# Patient Record
Sex: MALE | Race: WHITE | Employment: FULL TIME | URBAN - METROPOLITAN AREA
[De-identification: names, ages, dates, MRNs, and addresses within clinical notes are randomized per-mention and may not be internally consistent; named-entity substitution may affect disease eponyms.]

---

## 2017-07-11 ENCOUNTER — OFFICE VISIT (OUTPATIENT)
Dept: ORTHOPEDIC SURGERY | Age: 22
End: 2017-07-11

## 2017-07-11 VITALS
SYSTOLIC BLOOD PRESSURE: 143 MMHG | HEART RATE: 99 BPM | HEIGHT: 72 IN | DIASTOLIC BLOOD PRESSURE: 80 MMHG | WEIGHT: 205.03 LBS | BODY MASS INDEX: 27.77 KG/M2

## 2017-07-11 DIAGNOSIS — S82.852A TRIMALLEOLAR FRACTURE OF ANKLE, CLOSED, LEFT, INITIAL ENCOUNTER: Primary | ICD-10-CM

## 2017-07-11 PROBLEM — S82.853A TRIMALLEOLAR FRACTURE OF ANKLE, CLOSED: Status: ACTIVE | Noted: 2017-07-11

## 2017-07-11 PROCEDURE — 99243 OFF/OP CNSLTJ NEW/EST LOW 30: CPT | Performed by: ORTHOPAEDIC SURGERY

## 2017-07-11 RX ORDER — CEPHALEXIN 500 MG/1
500 CAPSULE ORAL 4 TIMES DAILY
Qty: 8 CAPSULE | Refills: 0 | Status: SHIPPED | OUTPATIENT
Start: 2017-07-11 | End: 2017-08-08

## 2017-07-11 RX ORDER — OXYCODONE HYDROCHLORIDE AND ACETAMINOPHEN 5; 325 MG/1; MG/1
1-2 TABLET ORAL EVERY 6 HOURS PRN
Qty: 60 TABLET | Refills: 0 | Status: SHIPPED | OUTPATIENT
Start: 2017-07-11 | End: 2017-08-10

## 2017-07-12 ENCOUNTER — HOSPITAL ENCOUNTER (OUTPATIENT)
Dept: OTHER | Age: 22
Discharge: HOME | End: 2017-07-12
Attending: ORTHOPAEDIC SURGERY | Admitting: ORTHOPAEDIC SURGERY

## 2017-07-12 LAB
ANION GAP SERPL CALCULATED.3IONS-SCNC: 13 MMOL/L (ref 3–16)
BUN BLDV-MCNC: 15 MG/DL (ref 7–20)
CALCIUM SERPL-MCNC: 9.7 MG/DL (ref 8.3–10.6)
CHLORIDE BLD-SCNC: 100 MMOL/L (ref 99–110)
CO2: 26 MMOL/L (ref 21–32)
CREAT SERPL-MCNC: 0.8 MG/DL (ref 0.9–1.3)
GFR AFRICAN AMERICAN: >60
GFR NON-AFRICAN AMERICAN: >60
GLUCOSE BLD-MCNC: 94 MG/DL (ref 70–99)
POTASSIUM SERPL-SCNC: 4.2 MMOL/L (ref 3.5–5.1)
SODIUM BLD-SCNC: 139 MMOL/L (ref 136–145)

## 2017-07-13 ENCOUNTER — HOSPITAL ENCOUNTER (OUTPATIENT)
Dept: SURGERY | Age: 22
Discharge: HOME | End: 2017-07-13
Attending: ORTHOPAEDIC SURGERY | Admitting: ORTHOPAEDIC SURGERY

## 2017-07-13 VITALS
WEIGHT: 205 LBS | TEMPERATURE: 97 F | OXYGEN SATURATION: 96 % | HEIGHT: 73 IN | SYSTOLIC BLOOD PRESSURE: 138 MMHG | HEART RATE: 78 BPM | BODY MASS INDEX: 27.17 KG/M2 | DIASTOLIC BLOOD PRESSURE: 68 MMHG | RESPIRATION RATE: 16 BRPM

## 2017-07-13 DIAGNOSIS — S82.852A CLOSED DISPLACED TRIMALLEOLAR FRACTURE OF LEFT LOWER LEG: ICD-10-CM

## 2017-07-13 RX ORDER — PROMETHAZINE HYDROCHLORIDE 25 MG/ML
6.25 INJECTION, SOLUTION INTRAMUSCULAR; INTRAVENOUS
Status: COMPLETED | OUTPATIENT
Start: 2017-07-13 | End: 2017-07-13

## 2017-07-13 RX ORDER — MORPHINE SULFATE 2 MG/ML
2 INJECTION, SOLUTION INTRAMUSCULAR; INTRAVENOUS EVERY 5 MIN PRN
Status: DISCONTINUED | OUTPATIENT
Start: 2017-07-13 | End: 2017-07-14 | Stop reason: HOSPADM

## 2017-07-13 RX ORDER — SODIUM CHLORIDE 0.9 % (FLUSH) 0.9 %
10 SYRINGE (ML) INJECTION EVERY 12 HOURS SCHEDULED
Status: DISCONTINUED | OUTPATIENT
Start: 2017-07-13 | End: 2017-07-14 | Stop reason: HOSPADM

## 2017-07-13 RX ORDER — MORPHINE SULFATE 2 MG/ML
1 INJECTION, SOLUTION INTRAMUSCULAR; INTRAVENOUS EVERY 5 MIN PRN
Status: DISCONTINUED | OUTPATIENT
Start: 2017-07-13 | End: 2017-07-14 | Stop reason: HOSPADM

## 2017-07-13 RX ORDER — MEPERIDINE HYDROCHLORIDE 50 MG/ML
12.5 INJECTION INTRAMUSCULAR; INTRAVENOUS; SUBCUTANEOUS EVERY 5 MIN PRN
Status: DISCONTINUED | OUTPATIENT
Start: 2017-07-13 | End: 2017-07-14 | Stop reason: HOSPADM

## 2017-07-13 RX ORDER — SODIUM CHLORIDE 0.9 % (FLUSH) 0.9 %
10 SYRINGE (ML) INJECTION PRN
Status: DISCONTINUED | OUTPATIENT
Start: 2017-07-13 | End: 2017-07-14 | Stop reason: HOSPADM

## 2017-07-13 RX ORDER — MIDAZOLAM HYDROCHLORIDE 5 MG/ML
INJECTION INTRAMUSCULAR; INTRAVENOUS
Status: DISCONTINUED
Start: 2017-07-13 | End: 2017-07-14 | Stop reason: HOSPADM

## 2017-07-13 RX ORDER — HYDRALAZINE HYDROCHLORIDE 20 MG/ML
5 INJECTION INTRAMUSCULAR; INTRAVENOUS EVERY 10 MIN PRN
Status: DISCONTINUED | OUTPATIENT
Start: 2017-07-13 | End: 2017-07-14 | Stop reason: HOSPADM

## 2017-07-13 RX ORDER — DIPHENHYDRAMINE HYDROCHLORIDE 50 MG/ML
12.5 INJECTION INTRAMUSCULAR; INTRAVENOUS
Status: ACTIVE | OUTPATIENT
Start: 2017-07-13 | End: 2017-07-13

## 2017-07-13 RX ORDER — OXYCODONE HYDROCHLORIDE AND ACETAMINOPHEN 5; 325 MG/1; MG/1
2 TABLET ORAL PRN
Status: ACTIVE | OUTPATIENT
Start: 2017-07-13 | End: 2017-07-13

## 2017-07-13 RX ORDER — OXYCODONE HYDROCHLORIDE AND ACETAMINOPHEN 5; 325 MG/1; MG/1
1 TABLET ORAL PRN
Status: ACTIVE | OUTPATIENT
Start: 2017-07-13 | End: 2017-07-13

## 2017-07-13 RX ORDER — ONDANSETRON 2 MG/ML
4 INJECTION INTRAMUSCULAR; INTRAVENOUS
Status: ACTIVE | OUTPATIENT
Start: 2017-07-13 | End: 2017-07-13

## 2017-07-13 RX ORDER — LABETALOL HYDROCHLORIDE 5 MG/ML
5 INJECTION, SOLUTION INTRAVENOUS EVERY 10 MIN PRN
Status: DISCONTINUED | OUTPATIENT
Start: 2017-07-13 | End: 2017-07-14 | Stop reason: HOSPADM

## 2017-07-13 RX ORDER — SODIUM CHLORIDE, SODIUM LACTATE, POTASSIUM CHLORIDE, CALCIUM CHLORIDE 600; 310; 30; 20 MG/100ML; MG/100ML; MG/100ML; MG/100ML
INJECTION, SOLUTION INTRAVENOUS CONTINUOUS
Status: DISCONTINUED | OUTPATIENT
Start: 2017-07-13 | End: 2017-07-14 | Stop reason: HOSPADM

## 2017-07-13 RX ORDER — LIDOCAINE HYDROCHLORIDE 10 MG/ML
0.3 INJECTION, SOLUTION EPIDURAL; INFILTRATION; INTRACAUDAL; PERINEURAL
Status: ACTIVE | OUTPATIENT
Start: 2017-07-13 | End: 2017-07-13

## 2017-07-13 RX ADMIN — Medication 0.5 MG: at 17:30

## 2017-07-13 RX ADMIN — Medication 0.5 MG: at 17:50

## 2017-07-13 RX ADMIN — SODIUM CHLORIDE, SODIUM LACTATE, POTASSIUM CHLORIDE, CALCIUM CHLORIDE: 600; 310; 30; 20 INJECTION, SOLUTION INTRAVENOUS at 17:42

## 2017-07-13 RX ADMIN — SODIUM CHLORIDE, SODIUM LACTATE, POTASSIUM CHLORIDE, CALCIUM CHLORIDE: 600; 310; 30; 20 INJECTION, SOLUTION INTRAVENOUS at 13:57

## 2017-07-13 RX ADMIN — Medication 0.5 MG: at 18:13

## 2017-07-13 RX ADMIN — PROMETHAZINE HYDROCHLORIDE 6.25 MG: 25 INJECTION, SOLUTION INTRAMUSCULAR; INTRAVENOUS at 18:05

## 2017-07-13 RX ADMIN — Medication 0.5 MG: at 18:08

## 2017-07-13 ASSESSMENT — PAIN SCALES - GENERAL
PAINLEVEL_OUTOF10: 0
PAINLEVEL_OUTOF10: 7
PAINLEVEL_OUTOF10: 8
PAINLEVEL_OUTOF10: 0
PAINLEVEL_OUTOF10: 5
PAINLEVEL_OUTOF10: 7
PAINLEVEL_OUTOF10: 7

## 2017-07-13 ASSESSMENT — PAIN - FUNCTIONAL ASSESSMENT: PAIN_FUNCTIONAL_ASSESSMENT: 0-10

## 2017-07-25 ENCOUNTER — OFFICE VISIT (OUTPATIENT)
Dept: ORTHOPEDIC SURGERY | Age: 22
End: 2017-07-25

## 2017-07-25 VITALS
BODY MASS INDEX: 27.17 KG/M2 | HEART RATE: 81 BPM | WEIGHT: 205.03 LBS | SYSTOLIC BLOOD PRESSURE: 115 MMHG | HEIGHT: 73 IN | DIASTOLIC BLOOD PRESSURE: 77 MMHG

## 2017-07-25 DIAGNOSIS — S82.852A TRIMALLEOLAR FRACTURE OF ANKLE, CLOSED, LEFT, INITIAL ENCOUNTER: Primary | ICD-10-CM

## 2017-07-25 PROCEDURE — 99024 POSTOP FOLLOW-UP VISIT: CPT | Performed by: ORTHOPAEDIC SURGERY

## 2017-07-25 PROCEDURE — 73610 X-RAY EXAM OF ANKLE: CPT | Performed by: ORTHOPAEDIC SURGERY

## 2017-08-08 ENCOUNTER — OFFICE VISIT (OUTPATIENT)
Dept: ORTHOPEDIC SURGERY | Age: 22
End: 2017-08-08

## 2017-08-08 VITALS — BODY MASS INDEX: 27.17 KG/M2 | HEIGHT: 73 IN | WEIGHT: 205.03 LBS

## 2017-08-08 DIAGNOSIS — S82.852A TRIMALLEOLAR FRACTURE OF ANKLE, CLOSED, LEFT, INITIAL ENCOUNTER: Primary | ICD-10-CM

## 2017-08-08 PROCEDURE — 73610 X-RAY EXAM OF ANKLE: CPT | Performed by: ORTHOPAEDIC SURGERY

## 2017-08-08 PROCEDURE — L4361 PNEUMA/VAC WALK BOOT PRE OTS: HCPCS | Performed by: ORTHOPAEDIC SURGERY

## 2017-08-08 PROCEDURE — 99024 POSTOP FOLLOW-UP VISIT: CPT | Performed by: ORTHOPAEDIC SURGERY

## 2017-08-29 ENCOUNTER — OFFICE VISIT (OUTPATIENT)
Dept: ORTHOPEDIC SURGERY | Age: 22
End: 2017-08-29

## 2017-08-29 VITALS
SYSTOLIC BLOOD PRESSURE: 114 MMHG | HEART RATE: 77 BPM | DIASTOLIC BLOOD PRESSURE: 79 MMHG | WEIGHT: 205.03 LBS | BODY MASS INDEX: 27.17 KG/M2 | HEIGHT: 73 IN

## 2017-08-29 DIAGNOSIS — S82.852A TRIMALLEOLAR FRACTURE OF ANKLE, CLOSED, LEFT, INITIAL ENCOUNTER: Primary | ICD-10-CM

## 2017-08-29 PROCEDURE — 99024 POSTOP FOLLOW-UP VISIT: CPT | Performed by: ORTHOPAEDIC SURGERY

## 2017-08-29 PROCEDURE — 73610 X-RAY EXAM OF ANKLE: CPT | Performed by: ORTHOPAEDIC SURGERY

## 2017-09-07 ENCOUNTER — HOSPITAL ENCOUNTER (OUTPATIENT)
Dept: PHYSICAL THERAPY | Age: 22
Discharge: HOME | End: 2017-09-30
Admitting: ORTHOPAEDIC SURGERY

## 2017-09-07 DIAGNOSIS — J32.9 CHRONIC SINUSITIS: ICD-10-CM

## 2017-09-07 NOTE — FLOWSHEET NOTE
[facility]  Orthopaedics and Sports Rehabilitation, [facility]    Physical Therapy Daily Treatment Note  Date:  2017    Patient Name:  Esther Baer    :  1995  MRN: [id number]  Restrictions/Precautions:    Medical/Treatment Diagnosis Information:  Diagnosis: S82.852A (ICD-10-CM) - Trimalleolar fracture of ankle, closed, left, initial encounter  Treatment Diagnosis: L foot pain  Insurance/Certification information:  PT Insurance Information:  C $0 CP 80/20 20 PT per year, no auth req  Physician Information:  Referring Practitioner: Dr. Silvia Ho of care signed (Y/N): Y    Date of Patient follow up with Physician:  17    G-Code (if applicable):      Date G-Code Applied:  17  PT G-Codes  Functional Assessment Tool Used: LEFS  Score: 61%  Functional Limitation: Mobility: Walking and moving around  Mobility: Walking and Moving Around Current Status (): At least 60 percent but less than 80 percent impaired, limited or restricted  Mobility: Walking and Moving Around Goal Status ():  At least 20 percent but less than 40 percent impaired, limited or restricted    Progress Note: [x]  Yes  []  No  Next due by: Visit #10       Latex Allergy:  [x]NO      []YES  Preferred Language for Healthcare:   [x]English       []other:    Visit # Insurance Allowable   1 20     Pain level:  5/10     SUBJECTIVE:  See eval    OBJECTIVE: See eval  Observation:   Test measurements:      RESTRICTIONS/PRECAUTIONS: boot donned, WBAT    Exercises/Interventions:     Therapeutic Ex Sets/sec Reps Notes HEP   Seated gastroc stretch 30\" 2  x   4-way ankle strength 20  Green: PF, Red inv/ev x   Seated heel raises 3 10  x   Ankle ABC's 2   x                                                                                       Manual Intervention                                                 NMR re-education                                                                          Therapeutic Exercise and NMR EXR  [x] (04244) Provided verbal/tactile cueing for activities related to strengthening, flexibility, endurance, ROM for improvements in LE, proximal hip, and core control with self care, mobility, lifting, ambulation.  [] (06453) Provided verbal/tactile cueing for activities related to improving balance, coordination, kinesthetic sense, posture, motor skill, proprioception  to assist with LE, proximal hip, and core control in self care, mobility, lifting, ambulation and eccentric single leg control.      NMR and Therapeutic Activities:    [x] (41139 or 91919) Provided verbal/tactile cueing for activities related to improving balance, coordination, kinesthetic sense, posture, motor skill, proprioception and motor activation to allow for proper function of core, proximal hip and LE with self care and ADLs  [] (16458) Gait Re-education- Provided training and instruction to the patient for proper LE, core and proximal hip recruitment and positioning and eccentric body weight control with ambulation re-education including up and down stairs     Home Exercise Program:    [x] (39144) Reviewed/Progressed HEP activities related to strengthening, flexibility, endurance, ROM of core, proximal hip and LE for functional self-care, mobility, lifting and ambulation/stair navigation   [] (36408)Reviewed/Progressed HEP activities related to improving balance, coordination, kinesthetic sense, posture, motor skill, proprioception of core, proximal hip and LE for self care, mobility, lifting, and ambulation/stair navigation      Manual Treatments:  PROM / STM / Oscillations-Mobs:  G-I, II, III, IV (PA's, Inf., Post.)  [] (94145) Provided manual therapy to mobilize LE, proximal hip and/or LS spine soft tissue/joints for the purpose of modulating pain, promoting relaxation,  increasing ROM, reducing/eliminating soft tissue swelling/inflammation/restriction, improving soft tissue extensibility and allowing for proper ROM for normal

## 2017-09-07 NOTE — PLAN OF CARE
devices/WB status) partial weight bearing with bilateral crutches    [x] Patient history, allergies, meds reviewed. Medical chart reviewed. See intake form. Review Of Systems (ROS):  [x]Performed Review of systems (Integumentary, CardioPulmonary, Neurological) by intake and observation. Intake form has been scanned into medical record. Patient has been instructed to contact their primary care physician regarding ROS issues if not already being addressed at this time. Co-morbidities/Complexities (which will affect course of rehabilitation):   [x]None           Arthritic conditions   []Rheumatoid arthritis (M05.9)  []Osteoarthritis (M19.91)   Cardiovascular conditions   []Hypertension (I10)  []Hyperlipidemia (E78.5)  []Angina pectoris (I20)  []Atherosclerosis (I70)   Musculoskeletal conditions   []Disc pathology   []Congenital spine pathologies   []Prior surgical intervention  []Osteoporosis (M81.8)  []Osteopenia (M85.8)   Endocrine conditions   []Hypothyroid (E03.9)  []Hyperthyroid Gastrointestinal conditions   []Constipation (A08.02)   Metabolic conditions   []Morbid obesity (E66.01)  []Diabetes type 1(E10.65) or 2 (E11.65)   []Neuropathy (G60.9)     Pulmonary conditions   []Asthma (J45)  []Coughing   []COPD (J44.9)   Psychological Disorders  []Anxiety (F41.9)  []Depression (F32.9)   []Other:   []Other:          Barriers to/and or personal factors that will affect rehab potential:              []Age  []Sex              []Motivation/Lack of Motivation                        []Co-Morbidities              []Cognitive Function, education/learning barriers              []Environmental, home barriers              []profession/work barriers  []past PT/medical experience  []other:  Justification:     Falls Risk Assessment (30 days):   [x] Falls Risk assessed and no intervention required.   [] Falls Risk assessed and Patient requires intervention due to being higher risk   TUG score (>12s at risk):     [] Falls [x]  NMR activation and proprioception for LE, Glutes and Core   [x]  Manual therapy as indicated for LE, Hip and spine to include: Dry Needling/IASTM, STM, PROM, Gr I-IV mobilizations, manipulation. [x] Modalities as needed that may include: thermal agents, E-stim, Biofeedback, US, iontophoresis as indicated  [x] Patient education on joint protection, postural re-education, activity modification, progression of HEP. HEP instruction: (see scanned forms)    GOALS:  Patient stated goal: improve ambulation    Therapist goals for Patient:   Short Term Goals: To be achieved in: 2 weeks  1. Independent in HEP and progression per patient tolerance, in order to prevent re-injury. 2. Patient will have a decrease in pain to facilitate improvement in movement, function, and ADLs as indicated by Functional Deficits. Long Term Goals: To be achieved in: 8 weeks  1. Disability index score of 20% or less for the LEFS to assist with reaching prior level of function. 2. Patient will demonstrate increased L ankle AROM to within 5 deg of unaffected LE to allow for proper joint functioning as indicated by patients Functional Deficits. 3. Patient will demonstrate an increase in Strength to 5/5 ankle strength in the left LE to allow for proper functional mobility as indicated by patients Functional Deficits. 4. Patient will return to work, school, and age appropriate functional activities without increased symptoms or restriction. 5. Patient will be able to ambulate for 20 min without increased pain to participate in school and community activities.       Electronically signed by:  Bassam Elise, PT, DPT

## 2017-09-12 ENCOUNTER — HOSPITAL ENCOUNTER (OUTPATIENT)
Dept: PHYSICAL THERAPY | Age: 22
Discharge: HOME | End: 2017-09-12
Admitting: ORTHOPAEDIC SURGERY

## 2017-09-12 DIAGNOSIS — J32.9 CHRONIC SINUSITIS: ICD-10-CM

## 2017-09-14 ENCOUNTER — HOSPITAL ENCOUNTER (OUTPATIENT)
Dept: PHYSICAL THERAPY | Age: 22
Discharge: HOME | End: 2017-09-14
Admitting: ORTHOPAEDIC SURGERY

## 2017-09-14 DIAGNOSIS — J32.9 CHRONIC SINUSITIS: ICD-10-CM

## 2017-09-19 ENCOUNTER — HOSPITAL ENCOUNTER (OUTPATIENT)
Dept: PHYSICAL THERAPY | Age: 22
Discharge: HOME | End: 2017-09-19
Admitting: ORTHOPAEDIC SURGERY

## 2017-09-19 DIAGNOSIS — J32.9 CHRONIC SINUSITIS: ICD-10-CM

## 2017-09-21 ENCOUNTER — HOSPITAL ENCOUNTER (OUTPATIENT)
Dept: PHYSICAL THERAPY | Age: 22
Discharge: HOME | End: 2017-09-21
Admitting: ORTHOPAEDIC SURGERY

## 2017-09-21 DIAGNOSIS — J32.9 CHRONIC SINUSITIS: ICD-10-CM

## 2017-09-26 ENCOUNTER — OFFICE VISIT (OUTPATIENT)
Dept: ORTHOPEDIC SURGERY | Age: 22
End: 2017-09-26

## 2017-09-26 ENCOUNTER — HOSPITAL ENCOUNTER (OUTPATIENT)
Dept: PHYSICAL THERAPY | Age: 22
Discharge: HOME | End: 2017-09-26
Admitting: ORTHOPAEDIC SURGERY

## 2017-09-26 VITALS — WEIGHT: 205.03 LBS | BODY MASS INDEX: 27.17 KG/M2 | HEIGHT: 73 IN

## 2017-09-26 DIAGNOSIS — J32.9 CHRONIC SINUSITIS: ICD-10-CM

## 2017-09-26 DIAGNOSIS — S82.852A TRIMALLEOLAR FRACTURE OF ANKLE, CLOSED, LEFT, INITIAL ENCOUNTER: Primary | ICD-10-CM

## 2017-09-26 PROCEDURE — 99024 POSTOP FOLLOW-UP VISIT: CPT | Performed by: ORTHOPAEDIC SURGERY

## 2017-09-26 PROCEDURE — L1902 AFO ANKLE GAUNTLET PRE OTS: HCPCS | Performed by: ORTHOPAEDIC SURGERY

## 2017-09-26 PROCEDURE — 73610 X-RAY EXAM OF ANKLE: CPT | Performed by: ORTHOPAEDIC SURGERY

## 2017-10-03 ENCOUNTER — HOSPITAL ENCOUNTER (OUTPATIENT)
Dept: PHYSICAL THERAPY | Age: 22
Discharge: HOME | End: 2017-10-03
Admitting: ORTHOPAEDIC SURGERY

## 2017-10-03 DIAGNOSIS — J32.9 CHRONIC SINUSITIS: ICD-10-CM

## 2017-10-03 NOTE — FLOWSHEET NOTE
[facility]  Orthopaedics and Sports Rehabilitation, [facility] [employer] [employer]    Physical Therapy Daily Treatment Note  Date:  10/3/2017    Patient Name:  Brandan Peacock   \"Dylan\" :  1995  MRN: [id number]  Restrictions/Precautions:    Medical/Treatment Diagnosis Information:  Diagnosis: S82.852A (ICD-10-CM) - Trimalleolar fracture of ankle, closed, left, initial encounter  Treatment Diagnosis: L foot pain  Insurance/Certification information:  PT Insurance Information:  [facility] $0 CP 80/20 20 PT per year, no auth req  Physician Information:  Referring Practitioner: Dr. Tiffany Garcia of care signed (Y/N): Y     Date of Patient follow up with Physician:  17    G-Code (if applicable):      Date G-Code Applied:  17       Progress Note: [x]  Yes  []  No  Next due by: Visit #10       Latex Allergy:  [x]NO      []YES  Preferred Language for Healthcare:   [x]English       []other:    Visit # Insurance Allowable   6 20     Pain level:  0/10     SUBJECTIVE:  Patient reports that he was able to walk around campus without crutches with his boot donned.      OBJECTIVE:   Observation:   Test measurements:     DF: 15   PF: 45   Inv: 25   Ev: 12    RESTRICTIONS/PRECAUTIONS: boot donned, WBAT    Exercises/Interventions:     Therapeutic Ex Sets/sec Reps Notes HEP   Bike  5'     gastroc S wedge  Pron/sup S on wedge 30\" 2  x   4-way ankle strength 50  Gray TB x   DL heel raises 2 10  x   Ecc heel raises  10 Kel with UE     x   Wobble board AROM 40x      Bridges + heel raise 2 10     Leg press 2 10 200# DL, 140# SL    Heel raises on leg press 2 10     Soleus seated HR 4 10 15#    Step ups 2 10 8 in                                Manual Intervention          PROM 5\"  Pt ed on stretching at home    Grade III subtalar mobs, joint distraction 5'                           NMR re-education       SL balance 10 10\"         Step to airex 10 10\"     SL stance + forward reach 2 8                                            Therapeutic Patient tolerated treatment well [] Patient limited by fatique  [] Patient limited by pain  [] Patient limited by other medical complications  [] Other:     Prognosis: [x] Good [] Fair  [] Poor    Patient Requires Follow-up: [x] Yes  [] No    PLAN: See eval  [x] Continue per plan of care [] Alter current plan (see comments)  [] Plan of care initiated [] Hold pending MD visit [] Discharge    Electronically signed by: Markel Chavez PT, DPT

## 2017-10-05 ENCOUNTER — HOSPITAL ENCOUNTER (OUTPATIENT)
Dept: PHYSICAL THERAPY | Age: 22
Discharge: HOME | End: 2017-10-05
Admitting: ORTHOPAEDIC SURGERY

## 2017-10-05 DIAGNOSIS — J32.9 CHRONIC SINUSITIS: ICD-10-CM

## 2017-10-05 NOTE — FLOWSHEET NOTE
verbal/tactile cueing for activities related to strengthening, flexibility, endurance, ROM for improvements in LE, proximal hip, and core control with self care, mobility, lifting, ambulation.  [] (36299) Provided verbal/tactile cueing for activities related to improving balance, coordination, kinesthetic sense, posture, motor skill, proprioception  to assist with LE, proximal hip, and core control in self care, mobility, lifting, ambulation and eccentric single leg control.      NMR and Therapeutic Activities:    [x] (59307 or 81506) Provided verbal/tactile cueing for activities related to improving balance, coordination, kinesthetic sense, posture, motor skill, proprioception and motor activation to allow for proper function of core, proximal hip and LE with self care and ADLs  [] (15989) Gait Re-education- Provided training and instruction to the patient for proper LE, core and proximal hip recruitment and positioning and eccentric body weight control with ambulation re-education including up and down stairs     Home Exercise Program:    [x] (31443) Reviewed/Progressed HEP activities related to strengthening, flexibility, endurance, ROM of core, proximal hip and LE for functional self-care, mobility, lifting and ambulation/stair navigation   [] (28906)Reviewed/Progressed HEP activities related to improving balance, coordination, kinesthetic sense, posture, motor skill, proprioception of core, proximal hip and LE for self care, mobility, lifting, and ambulation/stair navigation      Manual Treatments:  PROM / STM / Oscillations-Mobs:  G-I, II, III, IV (PA's, Inf., Post.)  [x] (32048) Provided manual therapy to mobilize LE, proximal hip and/or LS spine soft tissue/joints for the purpose of modulating pain, promoting relaxation,  increasing ROM, reducing/eliminating soft tissue swelling/inflammation/restriction, improving soft tissue extensibility and allowing for proper ROM for normal function with self care, mobility, lifting and ambulation. Modalities:  N/A        Charges:  Timed Code Treatment Minutes: 30   Total Treatment Minutes: 30     [] EVAL (LOW) 76945 (typically 20 minutes face-to-face)  [x] EH(90238) x  2   [] IONTO  [] NMR (65218) x      [] VASO  [] Manual (27107) x       [] Other:  [] TA x       [] Mech Traction (24167)  [] ES(attended) (72457)      [] ES (un) (85768):     GOALS:   Short Term Goals: To be achieved in: 2 weeks  1. Independent in HEP and progression per patient tolerance, in order to prevent re-injury. 2. Patient will have a decrease in pain to facilitate improvement in movement, function, and ADLs as indicated by Functional Deficits.     Long Term Goals: To be achieved in: 8 weeks  1. Disability index score of 20% or less for the LEFS to assist with reaching prior level of function. 2. Patient will demonstrate increased L ankle AROM to within 5 deg of unaffected LE to allow for proper joint functioning as indicated by patients Functional Deficits. 3. Patient will demonstrate an increase in Strength to 5/5 ankle strength in the left LE to allow for proper functional mobility as indicated by patients Functional Deficits. 4. Patient will return to work, school, and age appropriate functional activities without increased symptoms or restriction. 5. Patient will be able to ambulate for 20 min without increased pain to participate in school and community activities. Progression Towards Functional goals:  [x] Patient is progressing as expected towards functional goals listed. [] Progression is slowed due to complexities listed. [] Progression has been slowed due to co-morbidities. [] Plan just implemented, too soon to assess goals progression  [] Other:     ASSESSMENT:  Tolerated all ex, no increase in pain. Pt has improved plantarflexion against gravity, but has difficulty with eccentric control.     Treatment/Activity Tolerance:  [x] Patient tolerated treatment well [] Patient limited by fatique  [] Patient limited by pain  [] Patient limited by other medical complications  [] Other:     Prognosis: [x] Good [] Fair  [] Poor    Patient Requires Follow-up: [x] Yes  [] No    PLAN: See eval   [x] Continue per plan of care [] Alter current plan (see comments)  [] Plan of care initiated [] Hold pending MD visit [] Discharge    Electronically signed by: Zainab Meyer PT, DPT

## 2017-10-12 ENCOUNTER — HOSPITAL ENCOUNTER (OUTPATIENT)
Dept: PHYSICAL THERAPY | Age: 22
Discharge: HOME | End: 2017-10-12
Admitting: ORTHOPAEDIC SURGERY

## 2017-10-12 DIAGNOSIS — J32.9 CHRONIC SINUSITIS: ICD-10-CM

## 2017-10-12 NOTE — FLOWSHEET NOTE
allowing for proper ROM for normal function with self care, mobility, lifting and ambulation. Modalities:  N/A         Charges:  Timed Code Treatment Minutes: 38   Total Treatment Minutes: 38     [] EVAL (LOW) 61340 (typically 20 minutes face-to-face)  [x] XD(40680) x  2   [] IONTO  [x] NMR (59010) x  1   [] VASO  [] Manual (79280) x       [] Other:  [] TA x       [] Mech Traction (68043)  [] ES(attended) (11815)      [] ES (un) (44781):     GOALS:   Short Term Goals: To be achieved in: 2 weeks  1. Independent in HEP and progression per patient tolerance, in order to prevent re-injury. 2. Patient will have a decrease in pain to facilitate improvement in movement, function, and ADLs as indicated by Functional Deficits.     Long Term Goals: To be achieved in: 8 weeks  1. Disability index score of 20% or less for the LEFS to assist with reaching prior level of function. 2. Patient will demonstrate increased L ankle AROM to within 5 deg of unaffected LE to allow for proper joint functioning as indicated by patients Functional Deficits. 3. Patient will demonstrate an increase in Strength to 5/5 ankle strength in the left LE to allow for proper functional mobility as indicated by patients Functional Deficits. 4. Patient will return to work, school, and age appropriate functional activities without increased symptoms or restriction. 5. Patient will be able to ambulate for 20 min without increased pain to participate in school and community activities. Progression Towards Functional goals:  [x] Patient is progressing as expected towards functional goals listed. [] Progression is slowed due to complexities listed. [] Progression has been slowed due to co-morbidities. [] Plan just implemented, too soon to assess goals progression  [] Other:     ASSESSMENT:  Tolerated all ex, no increase in pain.  Pt has improved plantarflexion against gravity, but has difficulty with eccentric control.     Treatment/Activity Tolerance:  [x] Patient tolerated treatment well [] Patient limited by fatique  [] Patient limited by pain  [] Patient limited by other medical complications  [] Other:     Prognosis: [x] Good [] Fair  [] Poor    Patient Requires Follow-up: [x] Yes  [] No    PLAN: See eval   [x] Continue per plan of care [] Alter current plan (see comments)  [] Plan of care initiated [] Hold pending MD visit [] Discharge    Electronically signed by: [place] PT, DPT

## 2017-10-17 ENCOUNTER — HOSPITAL ENCOUNTER (OUTPATIENT)
Dept: PHYSICAL THERAPY | Age: 22
Discharge: HOME | End: 2017-10-17
Admitting: ORTHOPAEDIC SURGERY

## 2017-10-17 DIAGNOSIS — J32.9 CHRONIC SINUSITIS: ICD-10-CM

## 2017-10-17 NOTE — FLOWSHEET NOTE
[facility]  Orthopaedics and Sports Rehabilitation, [facility]    Physical Therapy Daily Treatment Note  Date:  10/17/2017    Patient Name:  Roseline Enciso   \"Dylan\" :  1995  MRN: [id number]  Restrictions/Precautions:    Medical/Treatment Diagnosis Information:  Diagnosis: S82.852A (ICD-10-CM) - Trimalleolar fracture of ankle, closed, left, initial encounter  Treatment Diagnosis: L foot pain  Insurance/Certification information:  PT Insurance Information:  [facility] $0 CP 80/20 20 PT per year, no auth req  Physician Information:  Referring Practitioner: Dr. Osvaldo Hays of care signed (Y/N): Y     Date of Patient follow up with Physician:  17    G-Code (if applicable):      Date G-Code Applied:  17       Progress Note: [x]  Yes  []  No  Next due by: Visit #10       Latex Allergy:  [x]NO      []YES  Preferred Language for Healthcare:   [x]English       []other:    Visit # Insurance Allowable    20     Pain level:  0/10     SUBJECTIVE:  Patient reports that he has been wearing the brace and has been walking a lot without pain.     OBJECTIVE:   Observation:   Test measurements:     DF: 15   PF: 45   Inv: 25   Ev: 12    RESTRICTIONS/PRECAUTIONS: boot donned, WBAT    Exercises/Interventions:     Therapeutic Ex Sets/sec Reps Notes HEP   Bike  5'     gastroc S wedge  Pron/sup S on wedge 30\" 2  x   4-way ankle strength 50  Gray TB x   DL heel raises 3 10  x   Ecc heel raises 3 10 Kel with UE     x        Bridges + heel raise 2 10     Leg press 2 10 200# DL, 140# SL    Heel raises on leg press 2 10     Soleus seated HR 3 30 35#    Step ups + HR 2 10 8 in    Wall lunge + calf raise 2 10                          Manual Intervention             Grade III subtalar mobs, joint distraction 5'                           NMR re-education       SL balance, foam 3 20\"             SL stance + forward reach 2 10     SL stance + ball toss 2 50 throws                                     Therapeutic Exercise and NMR EXR  [x] (56111) Provided verbal/tactile cueing for activities related to strengthening, flexibility, endurance, ROM for improvements in LE, proximal hip, and core control with self care, mobility, lifting, ambulation.  [] (52599) Provided verbal/tactile cueing for activities related to improving balance, coordination, kinesthetic sense, posture, motor skill, proprioception  to assist with LE, proximal hip, and core control in self care, mobility, lifting, ambulation and eccentric single leg control.      NMR and Therapeutic Activities:    [x] (97128 or 50557) Provided verbal/tactile cueing for activities related to improving balance, coordination, kinesthetic sense, posture, motor skill, proprioception and motor activation to allow for proper function of core, proximal hip and LE with self care and ADLs  [] (16507) Gait Re-education- Provided training and instruction to the patient for proper LE, core and proximal hip recruitment and positioning and eccentric body weight control with ambulation re-education including up and down stairs     Home Exercise Program:    [x] (86162) Reviewed/Progressed HEP activities related to strengthening, flexibility, endurance, ROM of core, proximal hip and LE for functional self-care, mobility, lifting and ambulation/stair navigation   [] (38280)Reviewed/Progressed HEP activities related to improving balance, coordination, kinesthetic sense, posture, motor skill, proprioception of core, proximal hip and LE for self care, mobility, lifting, and ambulation/stair navigation      Manual Treatments:  PROM / STM / Oscillations-Mobs:  G-I, II, III, IV (PA's, Inf., Post.)  [x] (79862) Provided manual therapy to mobilize LE, proximal hip and/or LS spine soft tissue/joints for the purpose of modulating pain, promoting relaxation,  increasing ROM, reducing/eliminating soft tissue swelling/inflammation/restriction, improving soft tissue extensibility and allowing for proper ROM for normal function with self care, mobility, lifting and ambulation. Modalities:  N/A        Charges:  Timed Code Treatment Minutes: 30   Total Treatment Minutes: 30     [] EVAL (LOW) 25608 (typically 20 minutes face-to-face)  [x] ET(27823) x  1   [] IONTO  [x] NMR (41664) x  1   [] VASO  [] Manual (90460) x       [] Other:  [] TA x       [] Mech Traction (05970)  [] ES(attended) (49412)      [] ES (un) (95977):     GOALS:   Short Term Goals: To be achieved in: 2 weeks  1. Independent in HEP and progression per patient tolerance, in order to prevent re-injury. 2. Patient will have a decrease in pain to facilitate improvement in movement, function, and ADLs as indicated by Functional Deficits.     Long Term Goals: To be achieved in: 8 weeks  1. Disability index score of 20% or less for the LEFS to assist with reaching prior level of function. 2. Patient will demonstrate increased L ankle AROM to within 5 deg of unaffected LE to allow for proper joint functioning as indicated by patients Functional Deficits. 3. Patient will demonstrate an increase in Strength to 5/5 ankle strength in the left LE to allow for proper functional mobility as indicated by patients Functional Deficits. 4. Patient will return to work, school, and age appropriate functional activities without increased symptoms or restriction. 5. Patient will be able to ambulate for 20 min without increased pain to participate in school and community activities. Progression Towards Functional goals:  [x] Patient is progressing as expected towards functional goals listed. [] Progression is slowed due to complexities listed. [] Progression has been slowed due to co-morbidities. [] Plan just implemented, too soon to assess goals progression  [] Other:     ASSESSMENT:  Tolerated all ex, no increase in pain. Pt has improved plantarflexion against gravity, but has difficulty with eccentric control.     Treatment/Activity Tolerance:  [x] Patient tolerated treatment well [] Patient limited by fatique  [] Patient limited by pain  [] Patient limited by other medical complications  [] Other:     Prognosis: [x] Good [] Fair  [] Poor    Patient Requires Follow-up: [x] Yes  [] No    PLAN: See eval   [x] Continue per plan of care [] Alter current plan (see comments)  [] Plan of care initiated [] Hold pending MD visit [] Discharge    Electronically signed by: Christianne Seymour PT, DPT

## 2017-10-31 ENCOUNTER — HOSPITAL ENCOUNTER (OUTPATIENT)
Dept: PHYSICAL THERAPY | Age: 22
Discharge: HOME | End: 2017-10-31
Admitting: ORTHOPAEDIC SURGERY

## 2017-10-31 DIAGNOSIS — J32.9 CHRONIC SINUSITIS: ICD-10-CM

## 2017-10-31 NOTE — PLAN OF CARE
around  Mobility: Walking and Moving Around Current Status (): At least 20 percent but less than 40 percent impaired, limited or restricted  Mobility: Walking and Moving Around Goal Status (): At least 1 percent but less than 20 percent impaired, limited or restricted    Progress Note: [x]  Yes  []  No  Next due by: Visit #10       Latex Allergy:  [x]NO      []YES  Preferred Language for Healthcare:   [x]English       []other:    Visit # Insurance Allowable   10 20     Pain level:  0/10     SUBJECTIVE:  Patient reports that he has been working on his exercises each day. He reports limitations at the gym. OBJECTIVE:   Observation:   Test measurements:       Left LE AROM MMT   DF 16 deg 5/5   PF 54 deg 4/5   Inv 40 deg 5/5   Ev 18 deg 5/5          Able to do 10 SL calf raises before fatigue. Patient is still limited in gait mechanics, plantarflexion strength, running activities, and functional activities such as reciprocal stair walking.     RESTRICTIONS/PRECAUTIONS: boot donned, WBAT    Exercises/Interventions:     Therapeutic Ex Sets/sec Reps Notes HEP   Bike  5'     gastroc S wedge  Pron/sup S on wedge 30\" 2  x   4-way ankle strength 50  Gray TB x   DL heel raises off edge of step 3 15  x   Ecc heel raises, knees ext and flex 3 10     SL heel raises 2 10  x        Bridges + heel raise 2 10     Leg press 2 10 200# DL, 140# SL    Heel raises on leg press 2 10     Soleus seated HR 3 30 35#       Wall lunge + calf raise 2 10     Treadmill walking 5'  Emphasis on gait mechanics and push-off                  Manual Intervention                                       NMR re-education       SL balance, bosu 5 15\"             SL stance + forward reach 2 20     SL stance + ball toss, foam 2 50 throws                                     Therapeutic Exercise and NMR EXR  [x] (53202) Provided verbal/tactile cueing for activities related to strengthening, flexibility, endurance, ROM for improvements in LE, proximal hip, and core control with self care, mobility, lifting, ambulation.  [] (45263) Provided verbal/tactile cueing for activities related to improving balance, coordination, kinesthetic sense, posture, motor skill, proprioception  to assist with LE, proximal hip, and core control in self care, mobility, lifting, ambulation and eccentric single leg control. NMR and Therapeutic Activities:    [x] (41997 or 94815) Provided verbal/tactile cueing for activities related to improving balance, coordination, kinesthetic sense, posture, motor skill, proprioception and motor activation to allow for proper function of core, proximal hip and LE with self care and ADLs  [] (87034) Gait Re-education- Provided training and instruction to the patient for proper LE, core and proximal hip recruitment and positioning and eccentric body weight control with ambulation re-education including up and down stairs     Home Exercise Program:    [x] (16536) Reviewed/Progressed HEP activities related to strengthening, flexibility, endurance, ROM of core, proximal hip and LE for functional self-care, mobility, lifting and ambulation/stair navigation   [] (79411)Reviewed/Progressed HEP activities related to improving balance, coordination, kinesthetic sense, posture, motor skill, proprioception of core, proximal hip and LE for self care, mobility, lifting, and ambulation/stair navigation      Manual Treatments:  PROM / STM / Oscillations-Mobs:  G-I, II, III, IV (PA's, Inf., Post.)  [x] (69400) Provided manual therapy to mobilize LE, proximal hip and/or LS spine soft tissue/joints for the purpose of modulating pain, promoting relaxation,  increasing ROM, reducing/eliminating soft tissue swelling/inflammation/restriction, improving soft tissue extensibility and allowing for proper ROM for normal function with self care, mobility, lifting and ambulation.      Modalities:  N/A        Charges:  Timed Code Treatment Minutes: 45   Total Treatment Minutes: 45     [] EVAL (LOW) 73086 (typically 20 minutes face-to-face)  [x] NP(25167) x  1   [] IONTO  [x] NMR (83550) x  2   [] VASO  [] Manual (69470) x       [] Other:  [] TA x       [] Mech Traction (01575)  [] ES(attended) (01533)      [] ES (un) (16196):     GOALS:   Short Term Goals: To be achieved in: 2 weeks  1. Independent in HEP and progression per patient tolerance, in order to prevent re-injury. 2. Patient will have a decrease in pain to facilitate improvement in movement, function, and ADLs as indicated by Functional Deficits.     Long Term Goals: To be achieved in: 8 weeks  1. Disability index score of 20% or less for the LEFS to assist with reaching prior level of function. 2. Patient will demonstrate increased L ankle AROM to within 5 deg of unaffected LE to allow for proper joint functioning as indicated by patients Functional Deficits. 3. Patient will demonstrate an increase in Strength to 5/5 ankle strength in the left LE to allow for proper functional mobility as indicated by patients Functional Deficits. 4. Patient will return to work, school, and age appropriate functional activities without increased symptoms or restriction. 5. Patient will be able to ambulate for 20 min without increased pain to participate in school and community activities. Progression Towards Functional goals:  [x] Patient is progressing as expected towards functional goals listed. [] Progression is slowed due to complexities listed. [] Progression has been slowed due to co-morbidities. [] Plan just implemented, too soon to assess goals progression  [] Other:     ASSESSMENT:  Tolerated all ex, no increase in pain. Pt has improved plantarflexion against gravity, but has difficulty with eccentric control.     Treatment/Activity Tolerance:  [x] Patient tolerated treatment well [] Patient limited by fatique  [] Patient limited by pain  [] Patient limited by other medical complications  [] Other:

## 2017-11-01 ENCOUNTER — HOSPITAL ENCOUNTER (OUTPATIENT)
Dept: PHYSICAL THERAPY | Age: 22
Discharge: HOME | End: 2017-11-01
Admitting: ORTHOPAEDIC SURGERY

## 2017-11-01 ENCOUNTER — HOSPITAL ENCOUNTER (OUTPATIENT)
Dept: PHYSICAL THERAPY | Age: 22
Discharge: HOME | End: 2017-11-30
Attending: ORTHOPAEDIC SURGERY | Admitting: ORTHOPAEDIC SURGERY

## 2017-11-01 DIAGNOSIS — J32.9 CHRONIC SINUSITIS: ICD-10-CM

## 2017-11-01 NOTE — FLOWSHEET NOTE
[facility]  Orthopaedics and Sports Rehabilitation, [facility]      Physical Therapy Daily Treatment Note  Date:  2017    Patient Name:  Reginaldo Boykin   \"Dylan\"  :  1995  MRN: [id number]  Restrictions/Precautions:    Medical/Treatment Diagnosis Information:  Diagnosis: S82.852A (ICD-10-CM) - Trimalleolar fracture of ankle, closed, left, initial encounter  Treatment Diagnosis: L foot pain  Insurance/Certification information:  PT Insurance Information:  [facility] $0 CP 80/20 20 PT per year, no auth req  Physician Information:  Referring Practitioner: Dr. Coty Colon of care signed (Y/N): Y     Date of Patient follow up with Physician:  17    G-Code (if applicable):      Date G-Code Applied:  10/31/17       Progress Note: [x]  Yes  []  No  Next due by: Visit #10       Latex Allergy:  [x]NO      []YES  Preferred Language for Healthcare:   [x]English       []other:    Visit # Insurance Allowable   11 20     Pain level:  0/10     SUBJECTIVE:  Patient reports he was sore after last therapy session but has now since recovered. OBJECTIVE:   Observation:   Test measurements:       Left LE AROM MMT   DF 16 deg 5/5   PF 54 deg 4/5   Inv 40 deg 5/5   Ev 18 deg 5/5          Able to do 10 SL calf raises before fatigue. Patient is still limited in gait mechanics, plantarflexion strength, running activities, and functional activities such as reciprocal stair walking.     RESTRICTIONS/PRECAUTIONS: boot donned, WBAT    Exercises/Interventions:     Therapeutic Ex Sets/sec Reps Notes HEP   Bike  5'     gastroc S wedge  Pron/sup S on wedge 30\" 2  x   4-way ankle strength 50  Gray TB x   DL heel raises off edge of step 3 15  x   Ecc heel raises, knees ext and flex 3 10     SL heel raises 2 10  x        Bridges + heel raise 2 10     Leg press 2 10 200# DL, 140# SL    Heel raises on leg press 2 10     Soleus seated HR 3 30 35#       Wall lunge + calf raise 2 10     Treadmill walking 5'  Emphasis on gait mechanics and push-off                  Manual Intervention                                       NMR re-education       SL balance, bosu 5 15\"             SL stance + forward reach 2 20     SL stance + ball toss, foam 2 50 throws                                     Therapeutic Exercise and NMR EXR  [x] (15691) Provided verbal/tactile cueing for activities related to strengthening, flexibility, endurance, ROM for improvements in LE, proximal hip, and core control with self care, mobility, lifting, ambulation.  [] (41822) Provided verbal/tactile cueing for activities related to improving balance, coordination, kinesthetic sense, posture, motor skill, proprioception  to assist with LE, proximal hip, and core control in self care, mobility, lifting, ambulation and eccentric single leg control.      NMR and Therapeutic Activities:    [x] (34572 or 99462) Provided verbal/tactile cueing for activities related to improving balance, coordination, kinesthetic sense, posture, motor skill, proprioception and motor activation to allow for proper function of core, proximal hip and LE with self care and ADLs  [] (22388) Gait Re-education- Provided training and instruction to the patient for proper LE, core and proximal hip recruitment and positioning and eccentric body weight control with ambulation re-education including up and down stairs     Home Exercise Program:    [x] (26828) Reviewed/Progressed HEP activities related to strengthening, flexibility, endurance, ROM of core, proximal hip and LE for functional self-care, mobility, lifting and ambulation/stair navigation   [] (56899)Reviewed/Progressed HEP activities related to improving balance, coordination, kinesthetic sense, posture, motor skill, proprioception of core, proximal hip and LE for self care, mobility, lifting, and ambulation/stair navigation      Manual Treatments:  PROM / STM / Oscillations-Mobs:  G-I, II, III, IV (PA's, Inf., Post.)  [x] (44831) Provided manual co-morbidities. [] Plan just implemented, too soon to assess goals progression  [] Other:     ASSESSMENT:  Tolerated all ex, no increase in pain. Pt has improved plantarflexion against gravity, but has difficulty with eccentric control.     Treatment/Activity Tolerance:  [x] Patient tolerated treatment well [] Patient limited by fatique  [] Patient limited by pain  [] Patient limited by other medical complications  [] Other:     Prognosis: [x] Good [] Fair  [] Poor    Patient Requires Follow-up: [x] Yes  [] No    PLAN: See eval   [x] Continue per plan of care [] Alter current plan (see comments)  [] Plan of care initiated [] Hold pending MD visit [] Discharge    Electronically signed by: Ana María Hernandez PT, DPT

## 2017-11-06 ENCOUNTER — OFFICE VISIT (OUTPATIENT)
Dept: FAMILY MEDICINE CLINIC | Age: 22
End: 2017-11-06

## 2017-11-06 VITALS
TEMPERATURE: 97.5 F | DIASTOLIC BLOOD PRESSURE: 60 MMHG | WEIGHT: 221.4 LBS | HEART RATE: 80 BPM | BODY MASS INDEX: 29.34 KG/M2 | SYSTOLIC BLOOD PRESSURE: 110 MMHG

## 2017-11-06 DIAGNOSIS — J02.9 SORE THROAT: ICD-10-CM

## 2017-11-06 DIAGNOSIS — J02.0 ACUTE STREPTOCOCCAL PHARYNGITIS: Primary | ICD-10-CM

## 2017-11-06 LAB — S PYO AG THROAT QL: NORMAL

## 2017-11-06 PROCEDURE — G8427 DOCREV CUR MEDS BY ELIG CLIN: HCPCS | Performed by: PHYSICIAN ASSISTANT

## 2017-11-06 PROCEDURE — 99213 OFFICE O/P EST LOW 20 MIN: CPT | Performed by: PHYSICIAN ASSISTANT

## 2017-11-06 PROCEDURE — 1036F TOBACCO NON-USER: CPT | Performed by: PHYSICIAN ASSISTANT

## 2017-11-06 PROCEDURE — G8417 CALC BMI ABV UP PARAM F/U: HCPCS | Performed by: PHYSICIAN ASSISTANT

## 2017-11-06 PROCEDURE — G8484 FLU IMMUNIZE NO ADMIN: HCPCS | Performed by: PHYSICIAN ASSISTANT

## 2017-11-06 PROCEDURE — 87880 STREP A ASSAY W/OPTIC: CPT | Performed by: PHYSICIAN ASSISTANT

## 2017-11-06 RX ORDER — AMOXICILLIN 500 MG/1
500 CAPSULE ORAL 2 TIMES DAILY
Qty: 20 CAPSULE | Refills: 0 | Status: SHIPPED | OUTPATIENT
Start: 2017-11-06 | End: 2017-11-16

## 2017-12-01 ENCOUNTER — HOSPITAL ENCOUNTER (OUTPATIENT)
Dept: PHYSICAL THERAPY | Age: 22
Discharge: HOME | End: 2017-12-31
Attending: ORTHOPAEDIC SURGERY | Admitting: ORTHOPAEDIC SURGERY

## 2018-01-02 ENCOUNTER — OFFICE VISIT (OUTPATIENT)
Dept: FAMILY MEDICINE CLINIC | Age: 23
End: 2018-01-02

## 2018-01-02 VITALS
SYSTOLIC BLOOD PRESSURE: 116 MMHG | HEIGHT: 71 IN | OXYGEN SATURATION: 98 % | DIASTOLIC BLOOD PRESSURE: 68 MMHG | BODY MASS INDEX: 31.5 KG/M2 | WEIGHT: 225 LBS | HEART RATE: 70 BPM

## 2018-01-02 DIAGNOSIS — Z00.00 ENCOUNTER FOR ANNUAL PHYSICAL EXAMINATION EXCLUDING GYNECOLOGICAL EXAMINATION IN A PATIENT OLDER THAN 17 YEARS: Primary | ICD-10-CM

## 2018-01-02 DIAGNOSIS — J06.9 UPPER RESPIRATORY TRACT INFECTION, UNSPECIFIED TYPE: ICD-10-CM

## 2018-01-02 LAB
A/G RATIO: 2 (ref 1.1–2.2)
ALBUMIN SERPL-MCNC: 4.5 G/DL (ref 3.4–5)
ALP BLD-CCNC: 91 U/L (ref 40–129)
ALT SERPL-CCNC: 15 U/L (ref 10–40)
ANION GAP SERPL CALCULATED.3IONS-SCNC: 11 MMOL/L (ref 3–16)
AST SERPL-CCNC: 15 U/L (ref 15–37)
BASOPHILS ABSOLUTE: 0 K/UL (ref 0–0.2)
BASOPHILS RELATIVE PERCENT: 0.5 %
BILIRUB SERPL-MCNC: 0.3 MG/DL (ref 0–1)
BILIRUBIN, POC: 0
BLOOD URINE, POC: 0
BUN BLDV-MCNC: 13 MG/DL (ref 7–20)
CALCIUM SERPL-MCNC: 9.4 MG/DL (ref 8.3–10.6)
CHLORIDE BLD-SCNC: 104 MMOL/L (ref 99–110)
CHOLESTEROL, FASTING: 145 MG/DL (ref 0–199)
CLARITY, POC: NORMAL
CO2: 28 MMOL/L (ref 21–32)
COLOR, POC: NORMAL
CREAT SERPL-MCNC: 0.9 MG/DL (ref 0.9–1.3)
EOSINOPHILS ABSOLUTE: 0.5 K/UL (ref 0–0.6)
EOSINOPHILS RELATIVE PERCENT: 8.7 %
GFR AFRICAN AMERICAN: >60
GFR NON-AFRICAN AMERICAN: >60
GLOBULIN: 2.2 G/DL
GLUCOSE BLD-MCNC: 85 MG/DL (ref 70–99)
GLUCOSE URINE, POC: 0
HCT VFR BLD CALC: 43.6 % (ref 40.5–52.5)
HDLC SERPL-MCNC: 43 MG/DL (ref 40–60)
HEMOGLOBIN: 14.7 G/DL (ref 13.5–17.5)
KETONES, POC: 0
LDL CHOLESTEROL CALCULATED: 86 MG/DL
LEUKOCYTE EST, POC: 0
LYMPHOCYTES ABSOLUTE: 1.3 K/UL (ref 1–5.1)
LYMPHOCYTES RELATIVE PERCENT: 21.4 %
MCH RBC QN AUTO: 30.5 PG (ref 26–34)
MCHC RBC AUTO-ENTMCNC: 33.8 G/DL (ref 31–36)
MCV RBC AUTO: 90.4 FL (ref 80–100)
MONOCYTES ABSOLUTE: 1 K/UL (ref 0–1.3)
MONOCYTES RELATIVE PERCENT: 16.8 %
NEUTROPHILS ABSOLUTE: 3.1 K/UL (ref 1.7–7.7)
NEUTROPHILS RELATIVE PERCENT: 52.6 %
NITRITE, POC: 0
PDW BLD-RTO: 13.7 % (ref 12.4–15.4)
PH, POC: 5.5
PLATELET # BLD: 221 K/UL (ref 135–450)
PMV BLD AUTO: 7.7 FL (ref 5–10.5)
POTASSIUM SERPL-SCNC: 4.6 MMOL/L (ref 3.5–5.1)
PROTEIN, POC: 0
RBC # BLD: 4.82 M/UL (ref 4.2–5.9)
SODIUM BLD-SCNC: 143 MMOL/L (ref 136–145)
SPECIFIC GRAVITY, POC: 1.03
TOTAL PROTEIN: 6.7 G/DL (ref 6.4–8.2)
TRIGLYCERIDE, FASTING: 81 MG/DL (ref 0–150)
UROBILINOGEN, POC: 0.2
VLDLC SERPL CALC-MCNC: 16 MG/DL
WBC # BLD: 5.9 K/UL (ref 4–11)

## 2018-01-02 PROCEDURE — 90471 IMMUNIZATION ADMIN: CPT | Performed by: PHYSICIAN ASSISTANT

## 2018-01-02 PROCEDURE — 36415 COLL VENOUS BLD VENIPUNCTURE: CPT | Performed by: PHYSICIAN ASSISTANT

## 2018-01-02 PROCEDURE — 81002 URINALYSIS NONAUTO W/O SCOPE: CPT | Performed by: PHYSICIAN ASSISTANT

## 2018-01-02 PROCEDURE — 99395 PREV VISIT EST AGE 18-39: CPT | Performed by: PHYSICIAN ASSISTANT

## 2018-01-02 PROCEDURE — 90686 IIV4 VACC NO PRSV 0.5 ML IM: CPT | Performed by: PHYSICIAN ASSISTANT

## 2018-01-02 NOTE — PROGRESS NOTES
[facility] EXAMINATION         Date of Exam: 1/2/2018    Patient's Name: Helen Rodriguez  Patient's YOB: 1995       Chief Complaint:    Chief Complaint   Patient presents with    Annual Exam     Establish Care. Was seen on 11/6/17 for an ill visit. No new concerns. Goes to [address] [facility] [place]. Vision Left 20/15, Right 20/15, and Both 20/20           HPI: Helen Rodriguez is a 23 y.o. male who presents for a annual preventive health medical examination. Preventive Care:      Health Maintenance   Topic Date Due    HIV screen  06/26/2012- will do today    Meningococcal (MCV) Vaccine Age 0-22 Years (1 of 1) 06/26/2013- admits he had this already.  DTaP/Tdap/Td vaccine (1 - Tdap) 06/26/2016- wishes to defer    Flu vaccine (1) 09/01/2017-will do today. Last eye exam:  Few months ago- no change in eye glasses rx    Last hearing exam:  Grossly intact. Last Dental exam:   Goes every 6 months- needs wisdom teeth removed     Caffeine use:  rare  Exercise:  Does not work out, starting back up since ortho injury. Diet: needs improvement. Alcohol use: 6 per week- beer  Tobacco use: vaps, quit tobacco 2017      Cognition/MiniMental;concerns about forgetfulness?: no  Mental Health; concerns of anxiety or depression?: no       Last prostate exam and PSA: n/a  Self-testicular exams: No      Colonoscopy:  na  Perform routine skin checks? recommended    Seatbelt use: Yes  Living will: na      PROBLEM LIST:  Patient Active Problem List   Diagnosis    Trimalleolar fracture of ankle, closed    which has healed and anxious to return to exercise regularly.      PAST MEDICAL HISTORY:      Diagnosis Date    ADHD (attention deficit hyperactivity disorder)     Allergic rhinitis     Asthma        PAST SURGICAL HISTORY:  Past Surgical History:   Procedure Laterality Date    CYST REMOVAL      head    OTHER SURGICAL HISTORY  07/13/2017    ORIF  left trimalleolar fx       Family History   Problem Relation Age of Onset    Cancer Mother      breast        SOCIAL HISTORY updated:   Social History     Social History    Marital status: Single     Spouse name: N/A    Number of children: N/A    Years of education: N/A     Occupational History    Not on file. Social History Main Topics    Smoking status: Former Smoker     Types: Cigarettes     Quit date: 1/2/2017    Smokeless tobacco: Current User- VAPS    Alcohol use Yes      Comment: 6 drinks per week    Drug use: No    Sexual activity: Yes     Partners: Female     Birth control/ protection: Condom         ALLERGIES:  Iodine    MEDICATIONS: NONE    REVIEW OF SYSTEMS:   Reviewed and otherwise unremarkable for other neurologic, constitutional, ENT, cardiac, pulmonary, GI, , or  musculoskeletal complaints. PHYSICAL EXAM:     Wt Readings from Last 3 Encounters:   01/02/18 225 lb (102.1 kg)   11/06/17 221 lb 6.4 oz (100.4 kg)   09/26/17 205 lb 0.4 oz (93 kg)     BP Readings from Last 3 Encounters:   01/02/18 116/68   11/06/17 110/60   08/29/17 114/79       Vitals:    01/02/18 1130   BP: 116/68   Site: Right Arm   Position: Sitting   Cuff Size: Large Adult   Pulse: 70   SpO2: 98%   Weight: 225 lb (102.1 kg)   Height: 5' 11\" (1.803 m)     Body mass index is 31.38 kg/m². GENERAL APPEARANCE:  Pleasant, friendly. Well-nourished. Looks in no distress. HEENT: Normocephalic. Atraumatic. EYES: Vision intact. EOMI, PERRLA. Conjunctivae pink and moist. Sclera white. Fundoscopic without hemorrhages or edema. Cornea and lens clear without opacities. EARS: Auricles symmetrical without lesions or deformities. Canals are clear without erythema. TM's intact bilaterally, gray, light reflex and landmarks intact. Hearing  intact. NOSE: No septal deviation. Nares have pink mucosa without lesions or discharge. Turbinates not swollen. MOUTH/THROAT:  Lips without lesions or cracking. Teeth intact without obvious caries. Buccal mucosa is moist. Throat is without erythema, edema, or exudates. Tongue and uvula are midline. NECK: No lymphadenopathy. Thyroid smooth, not enlarged. Spine non-tender and full range of motion without pain. LUNGS: Clear to auscultation without wheezes, rales, or rhonchi. Equal resonance to chest percussion bilaterally. CHEST/SPINE: No skin changes. No chest wall deformities. No costovertebral angle tenderness. No spine or paraspinal muscle tenderness or deformities. Full range of motion in all planes. HEART:  Regular rate and rhythm. No murmurs, rubs, or gallops. VASCULAR:  No jugular venous distension or carotid bruits. Pulses 2+ and symmetrical to carotid, femoral, and dorsalis pedis. Capillary refill <3secs. ABDOMEN: Without scars. Soft, non-tender, normoactive bowel sounds. No pulsatile masses or hepatosplenomegaly. No injuinal nodes or hernias. GENITORECTAL MALE: No abnormal skin changes. Is circumsized. Testes descended and is without tenderness. No scrotal masses. Cremasteric reflex intact. No inguinal hernias. EXTREMITIES: No skin changes. No new erythema, edema, or exudates. No bony deformities. Symmetrical muscle strength to all joints. Full range of motion without eliciting pain. Sensation intact. DTR's are equal and intact. Vascular is intact. NEUROLOGIC: Grossly non focal. Cranial Nerves II-XII intact. Negative Rhomberg. No pronator drift. Cerebellar functions intact using heel along shin/finger to nose. SKIN: Warm, dry and intact. No rashes, petechia, purpura. No suspicious lesions. No nail clubbing or cyanosis. PSYCHIATRIC:  Answers and understands questions appropriately. Normal affect, behavior,  and mood. ADDITIONAL DATA:    Last EKG: na   Lab Review: cbc, lipids, cmp, hiv screen , UA today.     ASSESSMENT & PLAN:    Roby Turner was seen today for annual exam.    Diagnoses and all orders for this visit:    Encounter for annual physical examination excluding gynecological examination in a patient older than 17 years  -     CBC Auto Differential  -     Comprehensive Metabolic Panel  -     Lipid, Fasting  -     HIV Screen  -     POCT Urinalysis no Micro    Upper respiratory tract infection- viral. Already improving per pt. Other orders  -     INFLUENZA, QUADV, 3 YRS AND OLDER, IM, PF, PREFILL SYR OR SDV, 0.5ML (FLUZONE QUADV, PF)    - Medical records updated today. - Discussed top 3 most important preventive health measures for Cydney Jeffrey which includes but not limited: discontinue use of Vap cigs, increase exercise, intake more low rubia foods to aid in weight control. Patient Instructions   Improved diet to include fruits and veges. Begin exercise to reduce weight.

## 2018-01-03 LAB — HIV-1 AND HIV-2 ANTIBODIES: NORMAL

## 2020-01-14 ENCOUNTER — TELEPHONE (OUTPATIENT)
Dept: FAMILY MEDICINE CLINIC | Age: 25
End: 2020-01-14

## 2020-01-14 NOTE — TELEPHONE ENCOUNTER
Patient's mother called. Patient is currently at [employer] She was requesting a prescription be called in for the flu. I advised patient would have to be seen, properly diagnosed and prescribed if necessary. Advised he could go to an urgent care or [facility] where he is. She understood. Advised of physician leaving and choosing another PCP.

## 2020-06-11 ENCOUNTER — TELEPHONE (OUTPATIENT)
Dept: FAMILY MEDICINE CLINIC | Age: 25
End: 2020-06-11

## 2020-06-11 NOTE — TELEPHONE ENCOUNTER
Patient's mother called. Patient has a growth on the side of his neck and she would like for him to be seen as soon as possible. He needs to establish care with a new pcp since Dr. Berenice Lorenzana has left. Will you accept him as a new patient, and possibly see him this Monday?

## 2020-06-17 ENCOUNTER — OFFICE VISIT (OUTPATIENT)
Dept: FAMILY MEDICINE CLINIC | Age: 25
End: 2020-06-17
Payer: COMMERCIAL

## 2020-06-17 VITALS
HEART RATE: 84 BPM | OXYGEN SATURATION: 99 % | BODY MASS INDEX: 34.84 KG/M2 | DIASTOLIC BLOOD PRESSURE: 62 MMHG | HEIGHT: 72 IN | TEMPERATURE: 98.1 F | RESPIRATION RATE: 16 BRPM | WEIGHT: 257.2 LBS | SYSTOLIC BLOOD PRESSURE: 116 MMHG

## 2020-06-17 PROCEDURE — 4004F PT TOBACCO SCREEN RCVD TLK: CPT | Performed by: PHYSICIAN ASSISTANT

## 2020-06-17 PROCEDURE — G8427 DOCREV CUR MEDS BY ELIG CLIN: HCPCS | Performed by: PHYSICIAN ASSISTANT

## 2020-06-17 PROCEDURE — 99213 OFFICE O/P EST LOW 20 MIN: CPT | Performed by: PHYSICIAN ASSISTANT

## 2020-06-17 PROCEDURE — G8419 CALC BMI OUT NRM PARAM NOF/U: HCPCS | Performed by: PHYSICIAN ASSISTANT

## 2020-06-17 RX ORDER — CALCIUM CARBONATE 200(500)MG
1 TABLET,CHEWABLE ORAL DAILY
COMMUNITY

## 2020-06-17 ASSESSMENT — PATIENT HEALTH QUESTIONNAIRE - PHQ9
2. FEELING DOWN, DEPRESSED OR HOPELESS: 0
SUM OF ALL RESPONSES TO PHQ QUESTIONS 1-9: 0
1. LITTLE INTEREST OR PLEASURE IN DOING THINGS: 0
SUM OF ALL RESPONSES TO PHQ9 QUESTIONS 1 & 2: 0
SUM OF ALL RESPONSES TO PHQ QUESTIONS 1-9: 0

## 2020-06-17 NOTE — PROGRESS NOTES
left jaw line. Warm and dry, capillary refill <2 seconds. No rashes, petechiae, skin turgor        ASSESSMENT/PLAN:    1. Horn cyst    - defer to dermatology next month for excision. Reassured patient. 2. Left ankle popping s/p ORIF Trimal fx  -to see Dr Baltazar Lundberg next week due to increased pain. Advised to avoid prolonged standing and weight bearing. 3 HEALTH MAINTENANCE:   - appt in 30 days for a new patient complete PE with FBW.  Last one was 1/2/2018

## 2020-06-24 ENCOUNTER — OFFICE VISIT (OUTPATIENT)
Dept: ORTHOPEDIC SURGERY | Age: 25
End: 2020-06-24
Payer: COMMERCIAL

## 2020-06-24 VITALS — WEIGHT: 257.28 LBS | BODY MASS INDEX: 34.85 KG/M2 | HEIGHT: 72 IN

## 2020-06-24 PROCEDURE — 99213 OFFICE O/P EST LOW 20 MIN: CPT | Performed by: ORTHOPAEDIC SURGERY

## 2020-06-24 PROCEDURE — G8427 DOCREV CUR MEDS BY ELIG CLIN: HCPCS | Performed by: ORTHOPAEDIC SURGERY

## 2020-06-24 PROCEDURE — G8417 CALC BMI ABV UP PARAM F/U: HCPCS | Performed by: ORTHOPAEDIC SURGERY

## 2020-06-24 PROCEDURE — 4004F PT TOBACCO SCREEN RCVD TLK: CPT | Performed by: ORTHOPAEDIC SURGERY

## 2020-06-24 RX ORDER — MELOXICAM 15 MG/1
15 TABLET ORAL DAILY
Qty: 30 TABLET | Refills: 1 | Status: SHIPPED
Start: 2020-06-24 | End: 2020-06-24 | Stop reason: CLARIF

## 2020-06-24 RX ORDER — MELOXICAM 15 MG/1
15 TABLET ORAL DAILY
Qty: 30 TABLET | Refills: 1 | Status: SHIPPED | OUTPATIENT
Start: 2020-06-24

## 2020-06-24 NOTE — PROGRESS NOTES
Subjective: Patient is here for follow-up of his 7/13/2017 left trimalleolar ankle fracture open reduction internal fixation. I last saw in September 2017 and he states he has had off-and-on discomfort through the left ankle. 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 He has had some increased pain mainly over the posterior lateral aspect of the ankle as well as over the anterior and anterior medial ankle. Currently rates his pain at 7 out of 10. Walking makes it worse doing nothing makes it better he is in school for cyber security and hopefully is going to stop working at [employer] in the near future. Denies locking or catching. Objective: Physical exam shows antalgic gait. He has no significant swelling in the left ankle. Incisions are well-healed he has symmetric range of motion in the dorsiflexion plantarflexion of the opposite side. Anterior drawer and talar tilt show no gross laxity. He has no tenderness to palpation over the peroneal tendons but these do sublux. Strength is 4+/5 into dorsiflexion plantarflexion with no focal weakness. No midfoot instability. Full range of motion of both hips knees and his right ankle. Strength is 5/5 throughout. Alert and oriented x3. Imaging: 3 views of the left ankle show the hardware in good position. He has a little bit of degenerative change in the gutters but otherwise his tibiotalar joint looks good. There is no hardware failure his mortise is well reduced  Assessment and plan: This patient has some mild posttraumatic arthritis and has subluxation of his peroneal tendons. We discussed operative and nonoperative treatments and I wrote out his plan of care and copied it to the media section of his chart. I gave him meloxicam 15 mg p.o. daily and discussed the side effects. He will take this as needed only.   He will use a higher top shoe or brace that he has at home he is going to be on his feet for

## 2020-07-15 ENCOUNTER — OFFICE VISIT (OUTPATIENT)
Dept: FAMILY MEDICINE CLINIC | Age: 25
End: 2020-07-15
Payer: COMMERCIAL

## 2020-07-15 VITALS
TEMPERATURE: 98.2 F | WEIGHT: 255.8 LBS | DIASTOLIC BLOOD PRESSURE: 64 MMHG | HEART RATE: 74 BPM | BODY MASS INDEX: 33.9 KG/M2 | SYSTOLIC BLOOD PRESSURE: 118 MMHG | OXYGEN SATURATION: 98 % | HEIGHT: 73 IN

## 2020-07-15 PROCEDURE — 36415 COLL VENOUS BLD VENIPUNCTURE: CPT | Performed by: PHYSICIAN ASSISTANT

## 2020-07-15 PROCEDURE — 90471 IMMUNIZATION ADMIN: CPT | Performed by: PHYSICIAN ASSISTANT

## 2020-07-15 PROCEDURE — 99395 PREV VISIT EST AGE 18-39: CPT | Performed by: PHYSICIAN ASSISTANT

## 2020-07-15 PROCEDURE — 90715 TDAP VACCINE 7 YRS/> IM: CPT | Performed by: PHYSICIAN ASSISTANT

## 2020-07-15 NOTE — PATIENT INSTRUCTIONS
Goals for the next year:  1. Stop smoking and vapping  2. Improve diet to more plant based whole grain; more veggies and fruits, less red meat and raw sugars. 3. Reduce alcohol consumption to no more than  3 /day  4. For the heartburn- use OTC Pepcid    Please call your pharmacy if you need any refills of your medication(s). Please call our office at [phone] if you don't hear from us about your test results. Please be sure to call our office if your illness/problem has been treated for but has not completely resolved. Bring an accurate list of your medications with you at every appointment to ensure that we have the correct information. Our office hours are: Monday - Friday 7 am- 5 pm; Saturdays vary on doctor working.     Phone lines turn on at 8 am.

## 2020-07-15 NOTE — PROGRESS NOTES
[address] [facility] EXAMINATION         Date of Exam: 7/15/2020    Patient's Name: Phillip Petersen  Patient's YOB: 1995       Chief Complaint   Patient presents with    Annual Exam           HPI: Phillip Petersen is a 23 y.o. male who presents for a annual preventive health medical examination. Works for [employer] and goes to school. Does not like his job. His ADHD is controlled now without meds. Saw Dr Phyllis Blank for foot. Has been walking too much. Still has to make a derm appointment. States the horn cyst on face has decreased in size since he stopped messing with it. Has heartburn daily controlled with  TUMS      Preventive Care:   Last eye exam:    2month ago. Glasses  Hearing concerns: No  Last Dental exam:    Every 6 Months     Caffeine use:  0/ day  Exercise:  daily, includes: walking a lot daily at his work [employer]. And lifting. Diet: feels he needs improvement on   Healthier diet, \" I eat like crap\"  Alcohol use:   Yes- 3- 7 days/week and 6-7 each time. Strong family history. Tobacco/ Vapping use:   Yes smoke and vap. Smoke when he drinks. 1pp month. Cognition/ Mini Mental; concerns about forgetfulness?    no  Mental Health; concerns of anxiety or depression? yes - Adhd. dont pay attention. AAA screening:  n/a  Colonoscopy:  no famliy history. Perform monthly routine skin checks? No  Perform montly self-testicular exams?   No  Last prostate exam and PSA:   na    Seatbelt use: Yes  Living will: no           Health Maintenance   Topic Date Due    Varicella vaccine (1 of 2 - 2-dose childhood series) 06/26/1998    HPV vaccine (1 - Male 2-dose series) 06/26/2008    Flu vaccine (1) 09/01/2020    DTaP/Tdap/Td vaccine (2 - Td) 07/15/2030    HIV screen  Completed    Hepatitis A vaccine  Aged Out    Hepatitis B vaccine  Aged Out    Hib vaccine  Aged Out    Meningococcal (ACWY) vaccine  Aged Out    Pneumococcal 0-64 years Vaccine  Aged Out TempSrc: Temporal   SpO2: 98%   Weight: 255 lb 12.8 oz (116 kg)   Height: 6' 0.5\" (1.842 m)       Body mass index is 34.22 kg/m². GENERAL APPEARANCE:  Pleasant, friendly. Well-nourished. Looks in no distress. HEENT: Normocephalic. Atraumatic. EYES: Vision intact. EOMI, PERRLA. Conjunctivae pink and moist. Sclera white. Fundoscopic without hemorrhages or edema. Cornea and lens clear. EARS: Auricles symmetrical without lesions or deformities. Canals are clear without erythema. TM's intact bilaterally. Hearing  intact. NOSE: No septal deviation. Nares have pink mucosa without lesions or discharge. Turbinates not swollen. MOUTH/THROAT:  Lips without lesions or cracking. Teeth intact without obvious caries. Buccal mucosa is moist. Throat is without erythema, edema, or exudates. Tongue and uvula are midline. NECK: No lymphadenopathy. Thyroid smooth, not enlarged. Spine non-tender and full range of motion without pain. LUNGS: Clear to auscultation without wheezes, rales, or rhonchi. Equal resonance to chest percussion bilaterally. CHEST/SPINE: No skin changes or chest wall deformities. No CVAT. No spine or paraspinal muscle tenderness or deformities. Full range of motion in all planes. HEART:  Regular rate and rhythm. No murmurs, rubs, or gallops. VASCULAR:  No jugular venous distension or carotid bruits. Pulses 2+ and symmetrical to carotid, femoral, and dorsalis pedis. Capillary refill <3secs. ABDOMEN: Without scars. Soft, non-tender, normoactive bowel sounds. No pulsatile masses or hepatosplenomegaly. No injuinal nodes or hernias. GENITAL EXAM: No skin changes. is  circumcised. Testes descended without tenderness or scrotal masses. Cremasteric reflex intact. No inguinal hernias. RECTAL MALE:  exam deferred,   EXTREMITIES/BACK: No new skin or bony deformities. No new erythema, edema, or exudates. Symmetrical strength. Full range of motion without eliciting pain.  Sensation  and DTR' are equal and intact. Pulses equal and intact. NEUROLOGIC: Grossly non focal. Cranial Nerves II-XII intact. Negative Rhomberg. No pronator drift. Cerebellar functions intact using heel along shin/finger to nose. SKIN: Warm, dry, normal skin turgor. No rashes, petechia, purpura. No suspicious lesions. No nail clubbing or cyanosis. PSYCHIATRIC:  Answers and understands questions appropriately. Normal affect, behavior, and mood. ADDITIONAL DATA:  Prior clinic notes, labs and imaging reviewed. Lipid panel:   Lab Results   Component Value Date    HDL 43 01/02/2018    LDLCALC 86 01/02/2018        ASSESSMENT & PLAN:    Annual physical exam  -     Comprehensive Metabolic Panel  -     Lipid Panel  -     Tdap (age 6y and older) IM (Boostrix)    - Medical records updated. -Instructed on monthly routine skin and testicular exams.    - Discussed important preventive health measures  which includes but not limited:  Patient Instructions   Goals for the next year:  1. Stop smoking and vapping  2. Improve diet to more plant based whole grain; more veggies and fruits, less red meat and raw sugars. 3. Reduce alcohol consumption to no more than  3 /day  4. For the heartburn- use OTC Pepcid    Heartburn  -     Comprehensive Metabolic Panel  -     Lipid Panel    Asthma-resolved with current tobacco use and vapping  discussed risks of tobacco and vapping with this history. Allergic rhinitis   Stable.       Follow up: yearly

## 2020-07-16 ENCOUNTER — TELEPHONE (OUTPATIENT)
Dept: FAMILY MEDICINE CLINIC | Age: 25
End: 2020-07-16

## 2020-07-16 LAB
A/G RATIO: 2 (ref 1.1–2.2)
ALBUMIN SERPL-MCNC: 4.6 G/DL (ref 3.4–5)
ALP BLD-CCNC: 66 U/L (ref 40–129)
ALT SERPL-CCNC: 17 U/L (ref 10–40)
ANION GAP SERPL CALCULATED.3IONS-SCNC: 13 MMOL/L (ref 3–16)
AST SERPL-CCNC: 15 U/L (ref 15–37)
BILIRUB SERPL-MCNC: 0.5 MG/DL (ref 0–1)
BUN BLDV-MCNC: 11 MG/DL (ref 7–20)
CALCIUM SERPL-MCNC: 10.1 MG/DL (ref 8.3–10.6)
CHLORIDE BLD-SCNC: 101 MMOL/L (ref 99–110)
CHOLESTEROL, TOTAL: 186 MG/DL (ref 0–199)
CO2: 25 MMOL/L (ref 21–32)
CREAT SERPL-MCNC: 0.8 MG/DL (ref 0.9–1.3)
GFR AFRICAN AMERICAN: >60
GFR NON-AFRICAN AMERICAN: >60
GLOBULIN: 2.3 G/DL
GLUCOSE BLD-MCNC: 91 MG/DL (ref 70–99)
HDLC SERPL-MCNC: 45 MG/DL (ref 40–60)
LDL CHOLESTEROL CALCULATED: 116 MG/DL
POTASSIUM SERPL-SCNC: 4.4 MMOL/L (ref 3.5–5.1)
SODIUM BLD-SCNC: 139 MMOL/L (ref 136–145)
TOTAL PROTEIN: 6.9 G/DL (ref 6.4–8.2)
TRIGL SERPL-MCNC: 127 MG/DL (ref 0–150)
VLDLC SERPL CALC-MCNC: 25 MG/DL

## 2020-07-16 NOTE — TELEPHONE ENCOUNTER
I spoke to patient's mother and she stated patient has trouble sleeping and at a later date may need Ambien or Xanax. I advised you are not able to write those medications. She would like to transfer care to Dr. [facility] so the scripts could be written in the future. Ok to transfer?

## 2021-08-02 ENCOUNTER — OFFICE VISIT (OUTPATIENT)
Dept: FAMILY MEDICINE CLINIC | Age: 26
End: 2021-08-02
Payer: COMMERCIAL

## 2021-08-02 VITALS
HEIGHT: 72 IN | DIASTOLIC BLOOD PRESSURE: 84 MMHG | HEART RATE: 74 BPM | OXYGEN SATURATION: 98 % | TEMPERATURE: 98.1 F | RESPIRATION RATE: 16 BRPM | SYSTOLIC BLOOD PRESSURE: 102 MMHG | BODY MASS INDEX: 35.76 KG/M2 | WEIGHT: 264 LBS

## 2021-08-02 DIAGNOSIS — M47.817 LUMBOSACRAL SPONDYLOSIS WITHOUT MYELOPATHY: ICD-10-CM

## 2021-08-02 DIAGNOSIS — F43.10 PTSD (POST-TRAUMATIC STRESS DISORDER): ICD-10-CM

## 2021-08-02 DIAGNOSIS — G47.33 OSA (OBSTRUCTIVE SLEEP APNEA): Primary | ICD-10-CM

## 2021-08-02 PROBLEM — M79.18 MYOFASCIAL MUSCLE PAIN: Status: ACTIVE | Noted: 2017-06-27

## 2021-08-02 PROCEDURE — 99214 OFFICE O/P EST MOD 30 MIN: CPT | Performed by: PHYSICIAN ASSISTANT

## 2021-08-02 PROCEDURE — 4004F PT TOBACCO SCREEN RCVD TLK: CPT | Performed by: PHYSICIAN ASSISTANT

## 2021-08-02 PROCEDURE — G8427 DOCREV CUR MEDS BY ELIG CLIN: HCPCS | Performed by: PHYSICIAN ASSISTANT

## 2021-08-02 PROCEDURE — G8417 CALC BMI ABV UP PARAM F/U: HCPCS | Performed by: PHYSICIAN ASSISTANT

## 2021-08-02 ASSESSMENT — PATIENT HEALTH QUESTIONNAIRE - PHQ9
SUM OF ALL RESPONSES TO PHQ9 QUESTIONS 1 & 2: 0
1. LITTLE INTEREST OR PLEASURE IN DOING THINGS: 0
2. FEELING DOWN, DEPRESSED OR HOPELESS: 0
SUM OF ALL RESPONSES TO PHQ QUESTIONS 1-9: 0

## 2021-08-02 NOTE — PATIENT INSTRUCTIONS
Recommend CPAP, referred to pulmonology  Referred to  for PTSD.   Have COVID vaccine    Weight Watchers recommended

## 2021-08-02 NOTE — PROGRESS NOTES
1000 [facility] EXAMINATION         Date of Exam: 8/2/2021    Patient's Name: Soren Islas  Patient's YOB: 1995       Chief Complaint   Patient presents with    Annual Exam     Pt is here for annual physical exam,pt is not fasting for bloodwork           HPI: Soren Islas is a 27 y.o. male who presents for a annual preventive health medical examination. He had Annual PE on 2/21/21. Switching providers is all due to insurance. He is now employed, out of school and has his own insurance. -Diagnosed with ROXANNE but not using a CPAP . Wants to discuss alternative methods such as mouth guard, tonsillectomy. - diagnosed with PTSD with childhood trauma. No longer seeing a therapist. Wants to restart. - Found to have via MRI 2014, multiple levels of mild disc bulge that were noncompressive without central or foraminal stenosis. Spondylosis most significant at L4/5. REVIEW OF SYSTEMS:  Pertinent positive and negatives are in HPI. Remaining 14 ROS were reviewed and are unremarkable for other constitutional, EENT, cardiac, pulmonary, GI, , neurologic, musculoskeletal, or integumentary complaints.     PROBLEM LIST:  Patient Active Problem List   Diagnosis    Trimalleolar fracture of ankle, closed    Heartburn    Asthma    ADHD (attention deficit hyperactivity disorder)    Allergic rhinitis    Lumbosacral spondylosis without myelopathy    Myofascial muscle pain    PTSD (post-traumatic stress disorder)    ROXANNE (obstructive sleep apnea)       PAST MEDICAL HISTORY:      Diagnosis Date    Allergic rhinitis     Heartburn        Past Surgical History:   Procedure Laterality Date    ANKLE FRACTURE SURGERY Left 07/13/2017    CYST REMOVAL      head  horn cysts       Family History   Problem Relation Age of Onset    Cancer Mother         breast    Depression Mother     Hypertension Maternal Grandfather     Arthritis Maternal Grandfather     Heart Attack Paternal Grandfather     No Known Problems Paternal Grandmother         Social History     Tobacco Use    Smoking status: Current Some Day Smoker     Types: Cigarettes     Last attempt to quit: 2017     Years since quittin.5    Smokeless tobacco: Current User   Substance Use Topics    Alcohol use: Yes     Comment: 12 drinks per week worse with COVID        ALLERGIES:    Iodine and Seroquel  [quetiapine]    MEDICATIONS:  Current Outpatient Medications   Medication Sig Dispense Refill    meloxicam (MOBIC) 15 MG tablet Take 1 tablet by mouth daily (Patient not taking: Reported on 2021) 30 tablet 1    calcium carbonate (TUMS) 500 MG chewable tablet Take 1 tablet by mouth daily (Patient not taking: Reported on 2021)       No current facility-administered medications for this visit. PHYSICAL EXAM:     Wt Readings from Last 3 Encounters:   21 264 lb (119.7 kg)   07/15/20 255 lb 12.8 oz (116 kg)   20 257 lb 4.4 oz (116.7 kg)     BP Readings from Last 3 Encounters:   21 102/84   07/15/20 118/64   20 116/62     Vitals:    21 1516   BP: 102/84   Site: Left Upper Arm   Position: Sitting   Pulse: 74   Resp: 16   Temp: 98.1 °F (36.7 °C)   TempSrc: Temporal   SpO2: 98%   Weight: 264 lb (119.7 kg)   Height: 6' (1.829 m)       Body mass index is 35.8 kg/m². GENERAL APPEARANCE:  Pleasant, friendly. Well-nourished. Looks in no distress. HEENT: Normocephalic. Atraumatic. EYES: Vision intact. EOMI, PERRLA. Conjunctivae pink and moist. Sclera white. Fundoscopic without hemorrhages or edema. Cornea and lens clear. EARS: Auricles symmetrical without lesions or deformities. Canals are clear without erythema. TM's intact bilaterally. Hearing  intact. NOSE: MOUTH/THROAT: currently not evaluated. NECK: No lymphadenopathy. Thyroid smooth, not enlarged. Spine non-tender and full range of motion without pain.   LUNGS: Clear to auscultation without wheezes, rales, or rhonchi. Equal resonance to chest percussion bilaterally. CHEST/SPINE: No skin changes or chest wall deformities. No CVAT. No spine or paraspinal muscle tenderness or deformities. Full range of motion in all planes. HEART:  Regular rate and rhythm. No murmurs, rubs, or gallops. VASCULAR:  No jugular venous distension or carotid bruits. Pulses 2+ and symmetrical to carotid, femoral, and dorsalis pedis. Capillary refill <3secs. ABDOMEN: Without scars. Soft, non-tender, normoactive bowel sounds. No pulsatile masses or hepatosplenomegaly. EXTREMITIES/BACK: No new skin or bony deformities. No new erythema, edema, or exudates. Symmetrical strength. Full range of motion without eliciting pain. Sensation  and DTR's are equal and intact. Pulses equal and intact. NEUROLOGIC: Grossly non focal. Cranial Nerves II-XII intact. Negative Rhomberg. No pronator drift. Cerebellar functions intact using heel along shin/finger to nose. SKIN: Warm, dry, normal skin turgor. No rashes, petechia, purpura. No suspicious lesions. No nail clubbing or cyanosis. PSYCHIATRIC:  Answers and understands questions appropriately. Normal affect, behavior, and mood. ADDITIONAL DATA:  Prior clinic notes, labs and imaging reviewed. Lipid panel:   Lab Results   Component Value Date    TRIG 127 07/15/2020    HDL 45 07/15/2020    LDLCALC 116 07/15/2020        ASSESSMENT & PLAN:      ROXANNE (obstructive sleep apnea)  -     Yovana Oconnell MD, Pulmonary, [facility]  - discussed options. Lumbosacral spondylosis without myelopathy  -stable now. Is losing weight     PTSD (post-traumatic stress disorder)  -     Ambulatory referral to Social Work     Follow Up 6 months .  Will need lipids

## 2021-08-05 ENCOUNTER — TELEPHONE (OUTPATIENT)
Dept: PULMONOLOGY | Age: 26
End: 2021-08-05

## 2021-08-10 ENCOUNTER — TELEPHONE (OUTPATIENT)
Dept: FAMILY MEDICINE CLINIC | Age: 26
End: 2021-08-10

## 2021-08-10 NOTE — TELEPHONE ENCOUNTER
Explained the process for receiving machine. Told patient that order cannot be sent in before provider has assessed the patient at an appointment. Patient had sleep study at [facility]. Explained to patient that he would need to contact PCP to have her write the order for the CPAP, as F2F notes are required before the sleep study was ordered to get machine.

## 2021-08-10 NOTE — TELEPHONE ENCOUNTER
Patient said he was given a referral to sleep medicine for sleep apnea by [facility] at his last physical on 8/2/21 but could not get in with them until 10/1/21. He said he had a Sleep study 3 months ago and is hoping that [facility] can order a cpap machine for him because he said \"once you find out you are practically suffocating in your sleep you don't want to wait much longer\". Please advise if an order can be sent. See the encounter from pulmonology, they recommended he follow up with his PCP. Patient would like a follow up by tomorrow because he is very concerned.

## 2021-08-10 NOTE — TELEPHONE ENCOUNTER
Spoke to the patient and advised him the [facility] does not write for the cpap machine he would have to see the sleep specialist, I explained to him that he could see any provider within the practice and to give them a call ask for the first available or to be put on a cancellation list. Patient was very upset stating that he has know what is wrong with him for months but no one can write for the [facility] thing. He stated that he can't go back to the provider at [phone] because of his insurance.

## 2021-08-19 ENCOUNTER — VIRTUAL VISIT (OUTPATIENT)
Dept: PSYCHOLOGY | Age: 26
End: 2021-08-19
Payer: COMMERCIAL

## 2021-08-19 DIAGNOSIS — F43.10 PTSD (POST-TRAUMATIC STRESS DISORDER): Primary | ICD-10-CM

## 2021-08-19 PROCEDURE — 90791 PSYCH DIAGNOSTIC EVALUATION: CPT | Performed by: SOCIAL WORKER

## 2021-08-19 NOTE — PROGRESS NOTES
Behavioral Health Consultation  [facility]. BING Kauffman  8/19/2021  9:00 AM EDT      Time spent with Patient: 30 minutes  This is patient's first [facility] appointment. Reason for Consult:    Chief Complaint   Patient presents with    Anxiety    Depression     Referring Provider: Alexis Key, [address]  [address],  [address] [facility]    Pt provided informed consent for the behavioral health program. Discussed with patient model of service to include the limits of confidentiality (i.e. abuse reporting, suicide intervention, etc.) and short-term intervention focused approach. Pt indicated understanding. Feedback given to PCP. TELEHEALTH VISIT -- Audio/Visual (During AEQMV-87 public health emergency)  }  Pursuant to the emergency declaration under the [facility], [facility] waiver authority and the [employer] Act, this Virtual Visit was conducted, with patient's consent, to reduce the patient's risk of exposure to COVID-19 and provide continuity of care for an established patient. Services were provided through a video synchronous discussion virtually to substitute for in-person clinic visit. Pt gave verbal informed consent to participate in telehealth services. Conducted a risk-benefit analysis and determined that the patient's presenting problems are consistent with the use of telepsychology. Determined that the patient has sufficient knowledge and skills in the use of technology enabling them to adequately benefit from telepsychology. It was determined that this patient was able to be properly treated without an in-person session. Patient verified that they were currently located at the [facility] address that was provided during registration.     Verified the following information:  Patient's identification: Yes  Patient location: [address] [place] [id number]  Patient's call back number: [phone] Patient's emergency contact's name and number, as well as permission to contact them if needed: Extended Emergency Contact Information  Primary Emergency Contact: [facility] Phone: [phone]  Relation: Parent  Secondary Emergency Contact: Simba Harding  Address: [address], [address] [place] [address] Phone: [phone]  Relation: Parent     Provider location: [address]:  Pt endorses that he was seeing [facility] treatment, who dx him with PTSD. He does struggle with nightmares every night, has his entire life. He is a very busy with life and when it stops sxs catch up. 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 Had no idea he would end up in therapy. School was difficult with covid, he loved college and missed out on senior year. 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 Has been frustrated with people. Notices this as a sign to get tx. He would like to get into treatment, he is starting to feel increased anxiety throughout his day and will get triggered by events. Starting to cope in maladaptive ways, smokes cigarettes, been drinking more alcohol lately to cope anxiety. Does not do drugs. No SI/HI.      O:  MSE:    Appearance: adequate hygiene  Attitude: cooperative and friendly  Consciousness: alert  Orientation: oriented to person, place, time, general circumstance  Memory: recent and remote memory intact  Attention/Concentration: intact during session  Psychomotor Activity:normal  Eye Contact: normal  Speech: normal rate and volume, well-articulated  Mood: anxious  Affect: anxious  Perception: within normal limits  Thought Content: within normal limits  Thought Process: logical, coherent and goal-directed  Insight: good  Judgment: intact  Ability to understand instructions: Yes  Ability to respond meaningfully: Yes  Morbid Ideation: no   Suicide Assessment: no suicidal ideation, plan, or intent  Homicidal Ideation: no    History:    Medications:   Current Outpatient Medications   Medication Sig Dispense Refill    meloxicam (MOBIC) 15 MG tablet Take 1 tablet by mouth daily (Patient not taking: Reported on 2021) 30 tablet 1    calcium carbonate (TUMS) 500 MG chewable tablet Take 1 tablet by mouth daily (Patient not taking: Reported on 2021)       No current facility-administered medications for this visit. Social History:   Social History     Socioeconomic History    Marital status: Single     Spouse name: Not on file    Number of children: Not on file    Years of education: Not on file    Highest education level: Not on file   Occupational History    Occupation:      Employer: [employer] Occupation: student   Tobacco Use    Smoking status: Current Some Day Smoker     Types: Cigarettes     Last attempt to quit: 2017     Years since quittin.6    Smokeless tobacco: Current User   Vaping Use    Vaping Use: Every day   Substance and Sexual Activity    Alcohol use: Yes     Comment: 12 drinks per week worse with COVID    Drug use: No    Sexual activity: Yes     Partners: Female     Birth control/protection: Condom   Other Topics Concern    Not on file   Social History Narrative    Not on file     Social Determinants of Health     Financial Resource Strain:     Difficulty of Paying Living Expenses:    Food Insecurity:     Worried About Running Out of Food in the Last Year:     Ran Out of Food in the Last Year:    Transportation Needs:     Lack of Transportation (Medical):      Lack of Transportation (Non-Medical):    Physical Activity:     Days of Exercise per Week:     Minutes of Exercise per Session:    Stress:     Feeling of Stress :    Social Connections:     Frequency of Communication with Friends and Family:     Frequency of Social Gatherings with Friends and Family:     Attends Voodoo Services:     Active Member of Clubs or Organizations:     Attends Club or Organization Meetings:     Marital Status:    Intimate Partner Violence:     Fear of Current or Ex-Partner:     Emotionally Abused:     Physically Abused:     Sexually Abused:      TOBACCO:   reports that he has been smoking cigarettes. He uses smokeless tobacco.  ETOH:   reports current alcohol use. Family History:   Family History   Problem Relation Age of Onset    Cancer Mother         breast    Depression Mother     Hypertension Maternal Grandfather     Arthritis Maternal Grandfather     Heart Attack Paternal Grandfather     No Known Problems Paternal Grandmother        A:  Pt struggling with PTSD. Referral to [id number] [facility] for PTSD tx. No SI/HI, insight and motivation good. Diagnosis:    1.  PTSD (post-traumatic stress disorder)          Diagnosis Date    Allergic rhinitis     Heartburn      Plan:  Pt interventions:  Discussed benefits of referral for specialty care, Provided education on PTSD symptoms and treatment options for evidence-based treatment (Cognitive Processing Therapy and Prolonged Exposure), Motivational Interviewing to increase patient confidence and compliance with adhering to behavioral change plan, Motivational Interviewing to determine importance and readiness for change, Established rapport and Supportive techniques    Pt Behavioral Change Plan:   See Pt Instructions

## 2021-08-19 NOTE — PATIENT INSTRUCTIONS
1.  [facility] [address] [phone].   2.  Keep me posted via [employer]

## 2021-10-01 ENCOUNTER — TELEPHONE (OUTPATIENT)
Dept: FAMILY MEDICINE CLINIC | Age: 26
End: 2021-10-01

## 2021-10-01 ENCOUNTER — VIRTUAL VISIT (OUTPATIENT)
Dept: PULMONOLOGY | Age: 26
End: 2021-10-01
Payer: COMMERCIAL

## 2021-10-01 ENCOUNTER — TELEPHONE (OUTPATIENT)
Dept: PULMONOLOGY | Age: 26
End: 2021-10-01

## 2021-10-01 DIAGNOSIS — G47.419 NARCOLEPSY WITHOUT CATAPLEXY WITH HYPOCRETIN DEFICIENCY: ICD-10-CM

## 2021-10-01 DIAGNOSIS — E66.9 CLASS 2 OBESITY WITHOUT SERIOUS COMORBIDITY WITH BODY MASS INDEX (BMI) OF 35.0 TO 35.9 IN ADULT, UNSPECIFIED OBESITY TYPE: ICD-10-CM

## 2021-10-01 DIAGNOSIS — R44.2 HYPNOPOMPIC HALLUCINATION: ICD-10-CM

## 2021-10-01 DIAGNOSIS — G47.33 OBSTRUCTIVE SLEEP APNEA: Primary | ICD-10-CM

## 2021-10-01 DIAGNOSIS — G47.00 INSOMNIA, UNSPECIFIED TYPE: ICD-10-CM

## 2021-10-01 DIAGNOSIS — G47.10 HYPERSOMNOLENCE: ICD-10-CM

## 2021-10-01 DIAGNOSIS — G47.50 PARASOMNIA, UNSPECIFIED TYPE: ICD-10-CM

## 2021-10-01 PROCEDURE — G8427 DOCREV CUR MEDS BY ELIG CLIN: HCPCS | Performed by: INTERNAL MEDICINE

## 2021-10-01 PROCEDURE — G8417 CALC BMI ABV UP PARAM F/U: HCPCS | Performed by: INTERNAL MEDICINE

## 2021-10-01 PROCEDURE — 99204 OFFICE O/P NEW MOD 45 MIN: CPT | Performed by: INTERNAL MEDICINE

## 2021-10-01 PROCEDURE — G8484 FLU IMMUNIZE NO ADMIN: HCPCS | Performed by: INTERNAL MEDICINE

## 2021-10-01 ASSESSMENT — SLEEP AND FATIGUE QUESTIONNAIRES
HOW LIKELY ARE YOU TO NOD OFF OR FALL ASLEEP IN A CAR, WHILE STOPPED FOR A FEW MINUTES IN TRAFFIC: 0
HOW LIKELY ARE YOU TO NOD OFF OR FALL ASLEEP WHILE LYING DOWN TO REST IN THE AFTERNOON WHEN CIRCUMSTANCES PERMIT: 3
HOW LIKELY ARE YOU TO NOD OFF OR FALL ASLEEP WHILE SITTING QUIETLY AFTER LUNCH WITHOUT ALCOHOL: 1
HOW LIKELY ARE YOU TO NOD OFF OR FALL ASLEEP WHILE SITTING AND READING: 0
ESS TOTAL SCORE: 6
HOW LIKELY ARE YOU TO NOD OFF OR FALL ASLEEP WHILE SITTING AND TALKING TO SOMEONE: 0
HOW LIKELY ARE YOU TO NOD OFF OR FALL ASLEEP WHILE SITTING INACTIVE IN A PUBLIC PLACE: 0
HOW LIKELY ARE YOU TO NOD OFF OR FALL ASLEEP WHILE WATCHING TV: 1
HOW LIKELY ARE YOU TO NOD OFF OR FALL ASLEEP WHEN YOU ARE A PASSENGER IN A CAR FOR AN HOUR WITHOUT A BREAK: 1

## 2021-10-01 ASSESSMENT — ENCOUNTER SYMPTOMS
RESPIRATORY NEGATIVE: 1
GASTROINTESTINAL NEGATIVE: 1
EYES NEGATIVE: 1
ALLERGIC/IMMUNOLOGIC NEGATIVE: 1

## 2021-10-01 NOTE — TELEPHONE ENCOUNTER
Pt has been cussing and yelling at me over his sleep study not being sent to pulmonology or to [facility]. He states he tired of being on the phone with offices and paying a 50 dollar copay. Pt seen pulmonology today in the their note \" will get sleep study from [facility], was done 3/15/21 \" I explained to [place] [id number] would need to send the report to the Dr. Ruddy Marin and [facility]. He continued to yell at me. Pt states all his stuff should just be together and he shouldn't have to call different offices. I advised once again he would need to call [phone] to get the report and I didn't know why they didn't send. He asked for to speak to the manager. I transferred call to [facility].

## 2021-10-01 NOTE — LETTER
10/1/21        Adwoa Noguera      I have seen this patient in the office today and wanted to communicate my findings and recommendations. Patient Instructions      ASSESSMENT/PLAN:  1. Obstructive sleep apnea  2. 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 Hypnopompic hallucination  4. Insomnia, unspecified type  5. Hypersomnolence  6. Class 2 obesity without serious comorbidity with body mass index (BMI) of 35.0 to 35.9 in adult, unspecified obesity type  7. Parasomnia, unspecified type    I  RECOMMENDATIONS:       Advised to avoid driving when too sleepy to function safely and given a discussion of the risks of untreated apnea such as accidents, cognitive impairment, mood impairment, high blood pressure, various cardiac diseases and stroke. Weight loss was encouraged.        ESS is 9/24    Patient may have hypnopompic hallucinations and hypersomnolence and insomnia secondary to narcolepsy  Also does have a parasomnia where he does act out could be REM behavior disorder also      We will get AutoPap machine  Will get sleep study from [facility], was done 3/15/2021        Get back in 3 months                          Thank you for allowing me to assist in the care of the [facility], MD

## 2021-10-01 NOTE — TELEPHONE ENCOUNTER
Pt. Called 15 min past his appt. This morning. Stated he thought it was a VV. Stated he did not think it was worth his time to drive 45 min for a 15 min appt. We went ahead and made him a VV. He was very rude on the phone. When I called him back in the afternoon to let him know I was able to track down where he had his sleep study because he had just said I don't know somewhere in [place]. And that I would need for him to pick a DME and let me know where to send the orders, he began to scream at me and tell me that was crasy. I told him I would suggest he check with his insurance to see where they would cover that we had a 2 page list of companies. He asked if I would email him the list. He said he would not be scheduling a FU he just wants the machine ane he would find another doctor. Informed Dr. Dunbar [place] [employer] of pt. Behavior.

## 2021-10-01 NOTE — PATIENT INSTRUCTIONS
ASSESSMENT/PLAN:  1. Obstructive sleep apnea  2. 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 Hypnopompic hallucination  4. Insomnia, unspecified type  5. Hypersomnolence  6. Class 2 obesity without serious comorbidity with body mass index (BMI) of 35.0 to 35.9 in adult, unspecified obesity type  7. Parasomnia, unspecified type    I  RECOMMENDATIONS:       Advised to avoid driving when too sleepy to function safely and given a discussion of the risks of untreated apnea such as accidents, cognitive impairment, mood impairment, high blood pressure, various cardiac diseases and stroke. Weight loss was encouraged. ESS is 9/24    Patient may have hypnopompic hallucinations and hypersomnolence and insomnia secondary to narcolepsy  Also does have a parasomnia where he does act out could be REM behavior disorder also      We will get AutoPap machine  Will get sleep study from [facility], was done 3/15/2021        Get back in 3 months       Remember to bring a list of pulmonary medications and any CPAP or BiPAP machines to your next appointment with the office. Please keep all of your future appointments scheduled by [facility] office. Out of respect for other patients and providers, you may be asked to reschedule your appointment if you arrive later than your scheduled appointment time. Appointments cancelled less than 24hrs in advance will be considered a no show. Patients with three missed appointments within 1 year or four missed appointments within 2 years can be dismissed from the practice. Please be aware that our physicians are required to work in the Intensive Care Unit at [facility].  Your appointment may need to be rescheduled if they are designated to work during your appointment time. You may receive a survey regarding the care you received during your visit. Your input is valuable to us.   We encourage you to complete and return your survey. We hope you will choose us in the future for your healthcare needs. Pt instructed of all future appointment dates & times, including radiology, labs, procedures & referrals. If procedures were scheduled preparation instructions provided. Instructions on future appointments with [facility] were given.

## 2021-10-01 NOTE — TELEPHONE ENCOUNTER

## 2021-10-01 NOTE — PROGRESS NOTES
MA Communication: The following orders are received by verbal communication from Troy Sanhcez MD    Orders include:  Cpap       FU 3 mo/ pt. Would not schedule will be getting new provider.

## 2021-10-01 NOTE — TELEPHONE ENCOUNTER
Spoke with patient. He was extremely upset. Tried to advise the patient that Dr. Benjamin Linares had requested his information be sent. He continued to repeat his information should be in one spot, I mentioned it can be tricky when seen by 2 different healthcare networks, however, we have requested his results and once we receive them, they will be in one spot. Patient did not seem to understand and remained irritated. He wants to be in one healthcare network, stated \"we're done\", and hung up.

## 2021-10-01 NOTE — PROGRESS NOTES
Johnathon Gonsalez (:  1995) is a 27 y.o. male,New patient, here for evaluation of the following chief complaint(s):  New Patient (R/B [facility] Sleep studies done )         ASSESSMENT/PLAN:  1. Obstructive sleep apnea  2. 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 Hypnopompic hallucination  4. Insomnia, unspecified type  5. Hypersomnolence  6. Class 2 obesity without serious comorbidity with body mass index (BMI) of 35.0 to 35.9 in adult, unspecified obesity type  7. Parasomnia, unspecified type    I  RECOMMENDATIONS:       Advised to avoid driving when too sleepy to function safely and given a discussion of the risks of untreated apnea such as accidents, cognitive impairment, mood impairment, high blood pressure, various cardiac diseases and stroke. Weight loss was encouraged. ESS is     Patient may have hypnopompic hallucinations and hypersomnolence and insomnia secondary to narcolepsy  Also does have a parasomnia where he does act out could be REM behavior disorder also      We will get AutoPap machine  Will get sleep study from [facility], was done 3/15/2021        Get back in 3 months                 No follow-ups on file. [address] [facility]  [address]   [address] [place] [address]  Dept: [phone]  Loc: [phone]     Diagnosis:  [x] ROXANNE (ICD-10: G47.33)  o CSA (ICD-10: G47.31)  [] Complex Sleep Apnea (ICD-10: G47.37)  []  (ICD-10: G47.37)  [] Hypoxemia (ICD-10: R09.02)  [] COPD (J44.90)  [] Chronic Respiratory Failure with hypoxemia (J96.11)  [] Chronicr Respiratory Failure with hypercapnia (J96.12)  [] Restrictive Lung Disease (J98.4)      [x] New Rx (Device Preference: ______autoresmed___________________)     [] Change Order       [] Replace ___________    [] Discontinue Order -  [] CPAP    [] BPAP    [] PAP    [] Oxygen   /   AMA?  [] Yes   [] No Therapy    AHI: ________ DUKE: ________  LOW SpO2: ________%      DME:medical service company    DEVICE / SETTINGS RAMP / Vivi Yoon   []  CPAP () Pressure    Ramp: _________ min's  [] Ramp to patient preference General PAP Supply Kit  Medicaid does not cover heated tubing  ([employer])  PAP Tubing:  [x] Heated ()- 1/3 mos                         [x] Regular ()  1/3 mos  [x] Disposable Water Chamber () - 1/6 mos  [x] Disposable Filter () - 2/mo  [x] Non-Disposable Filter () - 1/6 mos   []  BiLevel PAP ()           IPAP        []  BiLevel PAP with   ()       Backup Rate ()      EPAP Rate  [] Adjust FLEX to patient comfort       SUPPLEMENTAL OXYGEN  [] OXYGEN:      Liter/min: _________  [] Continuous        [] Nocturnal  [] Bleed into PAP Device      [x]  Eterniam 5                  Max Press 20  Mask Interface Kit    [x] Mask interface () - 1/3 mos  [x] Nasal Cushion ()  2/mo  [x] Nasal Pillows ()  -2/mo  [x] Headgear ()  1/6 mos   []  AutoBiLevel () Pressure  ()      Support           []  ResMed® IVAPS EPAP  [] Overnight Oximetry on Room Air  [] Overnight Oximetry on PAP Therapy    Target Pt Rate  Min PS      Target Va  Patient Ht  Ti Max                Ti Min        Rise time  Max PS  Trigger  Cycle  Epap  Epap max  Epap min  The patient has a history of:  [] Excessive Daytime Sleepiness  [] Insomnia  [] Impaired Cognition  [] Ischemic Heart Disease  [] Hypertension  [] Mood Disorders          [] History of Stroke  Additional Orders:______________________________________________________________________________________________________________________________________________________________________________     Full Face Mask Kit    [] Full face mask ()  1/3 mos  [] Full Face Cushion ()  1/mo  [] Headgear ()  1/6 mos                                           [] Respironics® ASV Advanced ()     EPAP Min PS Min      EPAP Max  PS Max   Additional Supplies    [] Ruthie Pillow ()  [] Heated Humidifier Kit ()  Mask Specifications:   [] Patient Preference      -or-            Brand:______________ Size:_______________   Type: __________________________________   [x] Mask Refit:___________________________                                           Max Press  Ramp Time      Rate  Bi-flex: []1  []2  []3     [] Respironics® AVAPS: ()     IPAP Min  IPAP Max  Pressure Max  Epap max  Epap min  Rise time                      Avaps rate  EPAP   Additional Services    [] Annual PRN service and check of equipment  [] Routine service and check of equipment  [] Download and report compliance data   Tidal volume      Tigger                                     Rate                                         Inspiratory time                                                         The following equipment is Medically Necessary for the above stated patient. It is reasonable and necessary in reference to acceptable standards of medical practice for this condition, and is not prescribed as a convenience. Frequency of Use:    Daily                 Length of Need: 13 Months              o The patient requires BiLevel PAP and the following apply: []  The patient requires a Respiratory Assist Device (RAD) and the following apply:   o CPAP was tried and failed to meet therapeutic goals. [] CPAP was tried, but failed to meet therapeutic goals   o The prescribed mask interface has been properly fit, is the most comfortable to the patient and will be used with the BPAP device. [] The prescribed mask interface has been properly fit, is the most comfortable to the patient and will be used with the RAD.   o Current CPAP setting prevents patient from tolerating the therapy and lower CPAP settings fail to adequately control the symptoms of ROXANNE, improve sleep quality, or reduce AHI to acceptable levels.  [] Current CPAP setting prevent patient from tolerating the therapy and lower PAP settings fail to  adequately control ROXANNE symptoms, improve sleep quality, or reduce AHI to acceptable levels. [] There is significant improvement of the respiratory events on the RAD                                                                                                                                                                                                                                  Valentina Ramos MD               NPI- [id number]     [facility]- [id number]                    10/01/21       ____________________________                        _______________________           Physician Signature                                                         Date                                                 Subjective   SUBJECTIVE/OBJECTIVE:  Referral for sleep apnea  Referred from [facility], [place]  Was to be a in person but called at time of appointment to change to virtual appt  Initially seen 10/1/21          Was last seen by Dr. Chadd Verde  On 3/25/2021  Had a sleep studyPSG on 3/15/2021      DATEOFSTUDY 3/15/2021 03/15/2021   STUDYTYPE Adult 03/15/2021   PTWEIGHT 273.0 03/15/2021   RDIINDEX 17.1 03/15/2021   APNEAINDEX 0.8 03/15/2021   APNEAHYPOIND 10.7 03/15/2021   CENTAPNEAIND 0.4 03/15/2021   Georganne Forward 35.7 03/15/2021   BAGY6EFP 83 03/15/2021   RDIREM 40.0 03/15/2021       Snoring a big problem    Subjective:              27old year old,male, with PMH significant for snoring and acting,  that presents today for initial evaluation. Pt reports snoring  for many  and that it is getting same. Pt reports does  snore mild for years. Patient's partner does complain of pt snoring. Pt's partner does notice that he stops breathing during sleep. Pt's  does wake   himself with dry mouth, headache, GERD. Pt does  report fatigue or tiredness frequently. Pt sleeps more than 7  hours, and not overwhelming sleepiness attacks.   Pt  Does not dozes unintentionally while watching TV. While driving  does feel drowsy / nod off / fall sleep at stop lights. Pt does not have h/o sleepiness associated wrecks/near wrecks. Pt does not nod off while  unattended. Pt does report having restless legs 0 times a week. This is often accompanied by leg jerks during sleep, numbness in legs or feet, aches/burning/cramps in legs, feet. Does not report having nasal congestion. Does not for use of nasal sprays, nose or sinus surgery. Does not for broken nose, tonsillectomy, sleeping w/ chest raised. Does not sleep with oxygen. Pt does not have a dental appliance or braces on teeth. Does not  teeth grinding. Does report nightmares, sleep talking, dreaming during naps. When angry or laughing Anice Sunshine does not report cataplexy. he does report hallucinations when dozing off or immediately upon awakening. Does not report sleep paralysis. Does  have parasomnia. Patient's [place] Sleepiness score  is required. Patient  is CONSISTENT with moderate daytime sleepiness. Review of Systems   Constitutional: Negative. HENT: Negative. Eyes: Negative. Respiratory: Negative. Cardiovascular: Negative. Gastrointestinal: Negative. Endocrine: Negative. Genitourinary: Negative. Musculoskeletal: Negative. Skin: Negative. Allergic/Immunologic: Negative. Neurological: Negative. Hematological: Negative. Psychiatric/Behavioral: Negative. Objective   Physical Exam  Vitals and nursing note reviewed. Constitutional:       Appearance: Normal appearance. HENT:      Head: Normocephalic and atraumatic. Right Ear: External ear normal.      Left Ear: External ear normal.      Nose: Nose normal.      Mouth/Throat:      Comments: Mallampati 3  Eyes:      General: No scleral icterus. Conjunctiva/sclera: Conjunctivae normal.   Pulmonary:      Effort: No respiratory distress.    Musculoskeletal:         General: No swelling or tenderness. Normal range of motion. Cervical back: Normal range of motion. Skin:     Coloration: Skin is not jaundiced. Neurological:      Mental Status: He is alert and oriented to person, place, and time. Psychiatric:         Mood and Affect: Mood normal.         Thought Content:  Thought content normal.         Judgment: Judgment normal.                  An electronic signature was used to authenticate this note.    --Ryan Franks MD

## 2021-10-05 NOTE — TELEPHONE ENCOUNTER
Please print dismissal letter for [facility] to sign. Please scan the signed dismissal letter prior to sending.

## 2022-02-17 ENCOUNTER — TELEPHONE (OUTPATIENT)
Dept: PULMONOLOGY | Age: 27
End: 2022-02-17

## 2022-02-17 NOTE — TELEPHONE ENCOUNTER
limited to the following:    Your Provider(s) may not able to provide medical treatment for your particular condition and you may be required to seek alternative healthcare or emergency care services.  The electronic systems or other security protocols or safeguards used in the Service could fail, causing a breach of privacy of your medical or other information.  Given regulatory requirements in certain jurisdictions, your Provider(s) diagnosis and/or treatment options, especially pertaining to certain prescriptions, may be limited. Acceptance   1. You understand that Services will be provided via Telehealth. This process involves the use of HIPAA compliant and secure, real-time audio-visual interfacing with a qualified and appropriately trained provider located at [facility]. 2. You understand that, under no circumstances, will this session be recorded. 3. You understand that the Provider(s) at [facility] and other clinical participants will be party to the information obtained during the Telehealth session in accordance with best medical practices. 4. You understand that the information obtained during the Telehealth session will be used to help determine the most appropriate treatment options. 5. You understand that You have the right to revoke this consent at any point in time. 6. You understand that Telehealth is voluntary, and that continued treatment is not dependent upon consent. 7. You understand that, in the event of non-consent to Telehealth services and/or technical difficulties, you will obtain services as typically provided in the absence of Telehealth technology. 8. You understand that this consent will be kept in Your medical record. 9. No potential benefits from the use of Telehealth or specific results can be guaranteed. Your condition may not be cured or improved and, in some cases, may get worse.    10. There are limitations in the provision of medical care and treatment via Telehealth and the Service and you may not be able to receive diagnosis and/or treatment through the Service for every condition for which you seek diagnosis and/or treatment. 11. There are potential risks to the use of Telehealth, including but not limited to the risks described in this Telehealth Consent. 12. Your Provider(s) have discussed the use of Telehealth and the Service with you, including the benefits and risks of such and you have provided oral consent to your Provider(s) for the use of Telehealth and the Service. 15. You understand that it is your duty to provide your Provider(s) truthful, accurate and complete information, including all relevant information regarding care that you may have received or may be receiving from other healthcare providers outside of the Service. 14. You understand that each of your Provider(s) may determine in his or sole discretion that your condition is not suitable for diagnosis and/or treatment using the Service, and that you may need to seek medical care and treatment a specialist or other healthcare provider, outside of the Service. 15. You understand that you are fully responsible for payment for all services provided by Provider(s) or through use of the Service and that you may not be able to use third-party insurance. 16. You represent that (a) you have read this Telehealth Consent carefully, (b) you understand the risks and benefits of the Service and the use of Telehealth in the medical care and treatment provided to you by Provider(s) using the Service, and (c) you have the legal capacity and authority to provide this consent for yourself and/or the minor for which you are consenting under applicable federal and state laws, including laws relating to the age of [de-identified] and/or parental/guardian consent.    17. You give your informed consent to the use of Telehealth by Provider(s) using the Service under the terms described in the Terms of Service and this Telehealth Consent. The patient was read the following statement and has consented to the visit as of 2/17/22. The patient has been scheduled for their first telehealth visit on 9/8/22 with Dr. Robby Braun.

## 2022-03-16 ENCOUNTER — TELEPHONE (OUTPATIENT)
Dept: PULMONOLOGY | Age: 27
End: 2022-03-16

## 2022-03-22 NOTE — TELEPHONE ENCOUNTER
Received call from [employer] [facility] that they are waiting on call from [place] that he should ship soon they are updating him weekly.

## 2022-07-27 ENCOUNTER — TELEMEDICINE (OUTPATIENT)
Dept: PULMONOLOGY | Age: 27
End: 2022-07-27
Payer: COMMERCIAL

## 2022-07-27 DIAGNOSIS — G47.10 HYPERSOMNOLENCE: ICD-10-CM

## 2022-07-27 DIAGNOSIS — G47.33 OBSTRUCTIVE SLEEP APNEA: Primary | ICD-10-CM

## 2022-07-27 DIAGNOSIS — R44.2 HYPNOPOMPIC HALLUCINATION: ICD-10-CM

## 2022-07-27 DIAGNOSIS — G47.419 NARCOLEPSY WITHOUT CATAPLEXY WITH HYPOCRETIN DEFICIENCY: ICD-10-CM

## 2022-07-27 PROCEDURE — G8427 DOCREV CUR MEDS BY ELIG CLIN: HCPCS | Performed by: INTERNAL MEDICINE

## 2022-07-27 PROCEDURE — G8417 CALC BMI ABV UP PARAM F/U: HCPCS | Performed by: INTERNAL MEDICINE

## 2022-07-27 PROCEDURE — 99214 OFFICE O/P EST MOD 30 MIN: CPT | Performed by: INTERNAL MEDICINE

## 2022-07-27 PROCEDURE — 4004F PT TOBACCO SCREEN RCVD TLK: CPT | Performed by: INTERNAL MEDICINE

## 2022-07-27 ASSESSMENT — ENCOUNTER SYMPTOMS
EYES NEGATIVE: 1
ALLERGIC/IMMUNOLOGIC NEGATIVE: 1
GASTROINTESTINAL NEGATIVE: 1
RESPIRATORY NEGATIVE: 1

## 2022-07-27 NOTE — PATIENT INSTRUCTIONS
ASSESSMENT/PLAN:  1. Obstructive sleep apnea  2. 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 Hypnopompic hallucination  4. Hypersomnolence    I  RECOMMENDATIONS:       Advised to avoid driving when too sleepy to function safely and given a discussion of the risks of untreated apnea such as accidents, cognitive impairment, mood impairment, high blood pressure, various cardiac diseases and stroke. Weight loss was encouraged.        ESS is 9/24    Patient may have hypnopompic hallucinations and hypersomnolence and insomnia secondary to narcolepsy  Also does have a parasomnia where he does act out could be REM behavior disorder also      We will get AutoPap machine  Will get sleep study from [facility], was done 3/15/2021          DME is [facility]    I will get download and then call the patient back    Patient has an AutoPap  AutoPap min of 5 and a maximum of 20  EPR of 3  Responsive standard    Has been using it 74% of time  Only able to use several hours a night    90th percentile pressure of 5.8  Leak at 9.8 L/min  AHI of 0.6    I will ask for access of the machine via [employer]    I will have the  to 7 minimum

## 2022-07-27 NOTE — PROGRESS NOTES
Kishan Palmer (:  1995) is a 24 y.o. male,New patient, here for evaluation of the following chief complaint(s):  Sleep Apnea (31-90 day)         ASSESSMENT/PLAN:  1. Obstructive sleep apnea  2. 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 Hypnopompic hallucination  4. Hypersomnolence    I  RECOMMENDATIONS:       Advised to avoid driving when too sleepy to function safely and given a discussion of the risks of untreated apnea such as accidents, cognitive impairment, mood impairment, high blood pressure, various cardiac diseases and stroke. Weight loss was encouraged. ESS is     Patient may have hypnopompic hallucinations and hypersomnolence and insomnia secondary to narcolepsy  Also does have a parasomnia where he does act out could be REM behavior disorder also      We will get AutoPap machine  Will get sleep study from [facility], was done 3/15/2021          [facility]    I will get download and then call the patient back    Patient has an AutoPap  AutoPap min of 5 and a maximum of 20  EPR of 3  Responsive standard    Has been using it 74% of time  Only able to use several hours a night    90th percentile pressure of 5.8  Leak at 9.8 L/min  AHI of 0.6    I will ask for access of the machine via Airview    I will have the  to 7 minimum        Have video messaged him twice during this appointment  I have also called him twice and could not leave a message on the cell phone    No follow-ups on file. I have personally reviewed and summarized the old records a        I have reviewed the lab tests, radiology reports and medications    I have downloaded and interpreted the cpap/bipap/pap data.  I have made adjustments as described           [address]  Dept: [phone]  Loc: [phone]     Diagnosis:  [x] ROXANNE (ICD-10: G47.33)  o CSA (ICD-10: G47.31)  [] Complex Sleep Apnea (ICD-10: G47.37)  []  (ICD-10: G47.37)  [] Hypoxemia (ICD-10: R09.02)  [] COPD (J44.90)  [] Chronic Respiratory Failure with hypoxemia (J96.11)  [] Chronicr Respiratory Failure with hypercapnia (J96.12)  [] Restrictive Lung Disease (J98.4)      [] New Rx (Device Preference: _________________________)     [x] Change Order    I have changed the machine  [] Replace ___________  [] Clinical assessments and may include IN-Check device, spirometry and ETCO2 PRN    [] Discontinue Order -  [] CPAP    [] BPAP    [] PAP    [] Oxygen   /   AMA?  [] Yes   [] No              Therapy    AHI: ________ DUKE: ________  LOW SpO2: ________%      DME:    DEVICE / SETTINGS RAMP / COMFORT INTERFACE   []  CPAP () Pressure    Ramp: _________ min's  [] Ramp to patient preference General PAP Supply Kit  Medicaid does not cover heated tubing  ([employer])  PAP Tubing:  [] Heated ()- 1/3 mos                         [] Regular () - 1/3 mos  [] Disposable Water Chamber () - 1/6 mos  [] Disposable Filter () - 2/mo  [] Non-Disposable Filter () - 1/6 mos   []  BiLevel PAP ()           IPAP        []  BiLevel PAP with   ()       Backup Rate ()      EPAP Rate  [] Adjust FLEX to patient comfort       SUPPLEMENTAL OXYGEN  [] OXYGEN:      Liter/min: _________  [] Continuous        [] Nocturnal  [] Bleed into PAP Device      [x]  [employer] 7                  Max Press 20  Mask Interface Kit    [] Mask interface () - 1/3 mos  [] Nasal Cushion () - 2/mo  [] Nasal Pillows ()  -2/mo  [] Headgear () - 1/6 mos   []  AutoBiLevel () Pressure  ()      Support           []  ResMed® IVAPS EPAP  [] Overnight Oximetry on Room Air  [] Overnight Oximetry on PAP Therapy  [] Overnight Oximetry on ___ LPM of oxygen  []  Overnight Oximetry on  PAP therapy with _____ lpm of O2    Target Pt Rate  Min PS      Target Va  Patient Ht  Ti Max                Ti Min        Rise time  Max PS  Trigger  Cycle  Epap  Epap max  Epap min  The patient has a history of:  [] Excessive Daytime Sleepiness  [] Insomnia  [] Impaired Cognition  [] Ischemic Heart Disease  [] Hypertension  [] Mood Disorders          [] History of Stroke  Additional Orders:______________________________________________________________________________________________________________    [] Titrate to comfort  [x] Add cloud access via [employer] or [employer] Full Face Mask Kit    [] Full face mask () - 1/3 mos  [] Full Face Cushion () - 1/mo  [] Headgear () - 1/6 mos                                           [] Respironics® ASV Advanced ()     EPAP Min  PS Min      EPAP Max  PS Max   Additional Supplies    [] Chin Strap ()  [] Heated Humidifier Kit ()  Mask Specifications:   [] Patient Preference      -or-            Brand:______________ Size:_______________   Type: __________________________________   [] Mask Refit:___________________________  [] Mask Fitting:  [] Full     [] Nasal                []  Pillows                                Max Press  Ramp Time      Rate  Bi-flex: []1  []2  []3     [] Respironics® AVAPS: ()     IPAP Min  IPAP Max  Pressure Max  Epap max  Epap min  Rise time                      Avaps rate  EPAP   Additional Services    [] Annual PRN service and check of equipment  [] Routine service and check of equipment  [] Download and report compliance data   Tidal volume      Tigger                                     Rate                                         Inspiratory time                                                         The following equipment is Medically Necessary for the above stated patient. It is reasonable and necessary in reference to acceptable standards of medical practice for this condition, and is not prescribed as a convenience.            Frequency of Use: Daily                 Length of Need: 13 Months              o The patient requires BiLevel PAP and the following apply: []  The patient requires a Respiratory Assist Device (RAD) and the following apply:   o CPAP was tried and failed to meet therapeutic goals. [] CPAP was tried, but failed to meet therapeutic goals   o The prescribed mask interface has been properly fit, is the most comfortable to the patient and will be used with the BPAP device. [] The prescribed mask interface has been properly fit, is the most comfortable to the patient and will be used with the RAD.   o Current CPAP setting prevents patient from tolerating the therapy and lower CPAP settings fail to adequately control the symptoms of ROXANNE, improve sleep quality, or reduce AHI to acceptable levels. [] Current CPAP setting prevent patient from tolerating the therapy and lower PAP settings fail to  adequately control ROXANNE symptoms, improve sleep quality, or reduce AHI to acceptable levels.          [] There is significant improvement of the respiratory events on the RAD                                                                                                                                                                                                                                  Zahra Crawford MD               NPI- [id number]     Annie Sierra [phone]                    07/27/22       ____________________________                        _______________________           Physician Signature                                                         Date                                                   Subjective   SUBJECTIVE/OBJECTIVE:  Referral for sleep apnea  Referred from [facility] Telly Ojeda  Was to be a in person but called at time of appointment to change to virtual appt  Initially seen 10/1/21          Was last seen by Dr. Dora Chicas  On 3/25/2021  Had a sleep study-PSG on 3/15/2021      DATEOFSTUDY 3/15/2021 03/15/2021   STUDYTYPE Adult 03/15/2021   PTWEIGHT 273.0 03/15/2021   RDIINDEX 17.1 03/15/2021   APNEAINDEX 0.8 03/15/2021   APNEAHYPOIND 10.7 03/15/2021   CENTAPNEAIND 0.4 03/15/2021   REMAHI 35.7 03/15/2021   SRKI3LMU 83 03/15/2021   RDIREM 40.0 03/15/2021       Snoring a big problem    Subjective:              27old year old,male, with PMH significant for snoring and acting,  that presents today for initial evaluation. Pt reports snoring  for many  and that it is getting same. Pt reports does  snore mild for years. Patient's partner does complain of pt snoring. Pt's partner does notice that he stops breathing during sleep. Pt's  does wake   himself with dry mouth, headache, GERD. Pt does  report fatigue or tiredness frequently. Pt sleeps more than 7  hours, and not overwhelming sleepiness attacks. Pt  Does not dozes unintentionally while watching TV. While driving  does feel drowsy / nod off / fall sleep at stop lights. Pt does not have h/o sleepiness associated wrecks/near wrecks. Pt does not nod off while  unattended. Pt does report having restless legs 0 times a week. This is often accompanied by leg jerks during sleep, numbness in legs or feet, aches/burning/cramps in legs, feet. Does not report having nasal congestion. Does not for use of nasal sprays, nose or sinus surgery. Does not for broken nose, tonsillectomy, sleeping w/ chest raised. Does not sleep with oxygen. Pt does not have a dental appliance or braces on teeth. Does not  teeth grinding. Does report nightmares, sleep talking, dreaming during naps. When angry or laughing Sujit Hawthorne does not report cataplexy. he does report hallucinations when dozing off or immediately upon awakening. Does not report sleep paralysis. Does  have parasomnia. Patient's [place] Sleepiness score  is required. Patient  is CONSISTENT with moderate daytime sleepiness.           7/27/2022    TELEHEALTH EVALUATION -- Audio/Visual (During [place]-22 public health emergency)    HPI:    Fei Goddard (:  1995) has requested an audio/video evaluation for the following concern(s):      Fei Goddard, was evaluated through a synchronous (real-time) audio-video encounter. The patient (or guardian if applicable) is aware that this is a billable service, which includes applicable co-pays. This Virtual Visit was conducted with patient's (and/or legal guardian's) consent. The visit was conducted pursuant to the emergency declaration under the [facility], [address] [place] [address] authority and the [employer] Act. Patient identification was verified, and a caregiver was present when appropriate. The patient was located at Home: [address] [place] [address]. Provider was located at [facility] (Appt Dept): [address] [place] [address],  [address] [place] [address]. Total time spent on this encounter: Not billed by time    --Zahra Crawford MD on 2022 at 9:39 AM    An electronic signature was used to authenticate this note. I had ordered the AutoPap back in 2021 however the patient did not receive anything up until a month or so ago, he had to wait 8 months to get a machine  And since he is gotten the machine he has not been able to use it over the last week or 2 because the pressure feels little bit low  I do not have access to it over the Internet  DME is [facility]  No download was done                Review of Systems   Constitutional: Negative. HENT: Negative. Eyes: Negative. Respiratory: Negative. Cardiovascular: Negative. Gastrointestinal: Negative. Endocrine: Negative. Genitourinary: Negative. Musculoskeletal: Negative. Skin: Negative. Allergic/Immunologic: Negative. Neurological: Negative. Hematological: Negative. Psychiatric/Behavioral: Negative. There were no vitals filed for this visit.     Objective   Physical Exam  Vitals and nursing note reviewed. Constitutional:       Appearance: Normal appearance. HENT:      Head: Normocephalic and atraumatic. Right Ear: External ear normal.      Left Ear: External ear normal.      Nose: Nose normal.      Mouth/Throat:      Comments: Mallampati 3  Eyes:      General: No scleral icterus. Conjunctiva/sclera: Conjunctivae normal.   Pulmonary:      Effort: No respiratory distress. Musculoskeletal:         General: No swelling or tenderness. Normal range of motion. Cervical back: Normal range of motion. Skin:     Coloration: Skin is not jaundiced. Neurological:      Mental Status: He is alert and oriented to person, place, and time. Psychiatric:         Mood and Affect: Mood normal.         Thought Content:  Thought content normal.         Judgment: Judgment normal.                An electronic signature was used to authenticate this note.    --Rosendo Davis MD

## 2022-07-27 NOTE — LETTER
[address] [facility] [place] [place] [facility]  [address] [facility] [address] [place] [id number]  Phone: [phone]  Fax: [phone]    Marquita Markham MD        July 27, 2022     Patient: Chasity Banerjee   YOB: 1995   Date of Visit: 7/27/2022 7/27/22        Chasity Banerjee      I have seen this patient in the office today and wanted to communicate my findings and recommendations. Patient Instructions        ASSESSMENT/PLAN:  1. Obstructive sleep apnea  2. 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 Hypnopompic hallucination  4. Hypersomnolence    I  RECOMMENDATIONS:       Advised to avoid driving when too sleepy to function safely and given a discussion of the risks of untreated apnea such as accidents, cognitive impairment, mood impairment, high blood pressure, various cardiac diseases and stroke. Weight loss was encouraged.        ESS is 9/24    Patient may have hypnopompic hallucinations and hypersomnolence and insomnia secondary to narcolepsy  Also does have a parasomnia where he does act out could be REM behavior disorder also      We will get AutoPap machine  Will get sleep study from [facility], was done 3/15/2021          DME is [facility]    I will get download and then call the patient back    Patient has an AutoPap  AutoPap min of 5 and a maximum of 20  EPR of 3  Responsive standard    Has been using it 74% of time  Only able to use several hours a night    90th percentile pressure of 5.8  Leak at 9.8 L/min  AHI of 0.6    I will ask for access of the machine via Airview    I will have the  to 7 minimum                       Thank you for allowing me to assist in the care of the [facility], MD Marquita Markham MD

## 2022-08-17 ENCOUNTER — HOSPITAL ENCOUNTER (EMERGENCY)
Age: 27
Discharge: HOME | End: 2022-08-17
Payer: COMMERCIAL

## 2022-08-17 ENCOUNTER — APPOINTMENT (OUTPATIENT)
Dept: GENERAL RADIOLOGY | Age: 27
End: 2022-08-17
Payer: COMMERCIAL

## 2022-08-17 VITALS
WEIGHT: 270 LBS | TEMPERATURE: 98 F | DIASTOLIC BLOOD PRESSURE: 84 MMHG | BODY MASS INDEX: 35.78 KG/M2 | HEART RATE: 97 BPM | RESPIRATION RATE: 16 BRPM | OXYGEN SATURATION: 97 % | HEIGHT: 73 IN | SYSTOLIC BLOOD PRESSURE: 137 MMHG

## 2022-08-17 DIAGNOSIS — M79.605 LEFT LEG PAIN: Primary | ICD-10-CM

## 2022-08-17 PROCEDURE — 99283 EMERGENCY DEPT VISIT LOW MDM: CPT

## 2022-08-17 PROCEDURE — 73590 X-RAY EXAM OF LOWER LEG: CPT

## 2022-08-17 ASSESSMENT — ENCOUNTER SYMPTOMS
RESPIRATORY NEGATIVE: 1
EYES NEGATIVE: 1
BACK PAIN: 0
GASTROINTESTINAL NEGATIVE: 1

## 2022-08-18 NOTE — ED PROVIDER NOTES
[address] [facility]  ED  EMERGENCY DEPARTMENT ENCOUNTER        Pt Name: Ebonie Dowell  MRN: [id number]  [place] 1995  Date of evaluation: 8/17/2022  Provider: Dolores Escobedo PA-C  PCP: LIU Bustillos  Note Started: 9:30 PM EDT       BLAIR. I have evaluated this patient. My supervising physician was available for consultation. CHIEF COMPLAINT       Chief Complaint   Patient presents with    Leg Pain     Left leg injury during softball       HISTORY OF PRESENT ILLNESS   (Location, Timing/Onset, Context/Setting, Quality, Duration, Modifying Factors, Severity, Associated Signs and Symptoms)  Note limiting factors. Chief Complaint: Left lower leg pain, injury    Ebonie Dowell is a 24 y.o. male who presents complaining of left ankle pain just prior to arrival.  Patient was playing softball when there was a collision at second base and patient had someone fall on his left leg, twisted the left ankle. Patient complaining of lateral pain, sharp, worse with movement and bearing weight, relieved by rest, nonradiating, severe. Denies other injuries, fever, wound, weakness, paresthesia. Nursing Notes were all reviewed and agreed with or any disagreements were addressed in the HPI. REVIEW OF SYSTEMS    (2-9 systems for level 4, 10 or more for level 5)     Review of Systems   Constitutional: Negative. Eyes: Negative. Respiratory: Negative. Cardiovascular: Negative. Gastrointestinal: Negative. Musculoskeletal:  Negative for back pain and neck pain. Left ankle pain   Skin: Negative. Neurological: Negative. Positives and Pertinent negatives as per HPI. Except as noted above in the ROS, all other systems were reviewed and negative.        PAST MEDICAL HISTORY     Past Medical History:   Diagnosis Date    Allergic rhinitis     Heartburn          SURGICAL HISTORY     Past Surgical History:   Procedure Laterality Date    ANKLE FRACTURE SURGERY Left 07/13/2017 CYST REMOVAL      head  horn cysts         CURRENTMEDICATIONS       Previous Medications    CALCIUM CARBONATE (TUMS) 500 MG CHEWABLE TABLET    Take 1 tablet by mouth in the morning. MELOXICAM (MOBIC) 15 MG TABLET    Take 1 tablet by mouth daily         ALLERGIES     Iodine and Seroquel  [quetiapine]    FAMILYHISTORY       Family History   Problem Relation Age of Onset    Cancer Mother         breast    Depression Mother     Hypertension Maternal Grandfather     Arthritis Maternal Grandfather     Heart Attack Paternal Grandfather     No Known Problems Paternal Grandmother           SOCIAL HISTORY       Social History     Tobacco Use    Smoking status: Some Days     Types: Cigarettes     Start date: 6/26/2014    Smokeless tobacco: Current    Tobacco comments:     just vapes   Vaping Use    Vaping Use: Every day    Start date: 6/26/2017    Substances: Nicotine, Flavoring   Substance Use Topics    Alcohol use: Yes     Comment: 12 drinks per week worse with COVID    Drug use: No       SCREENINGS             PHYSICAL EXAM    (up to 7 for level 4, 8 or more for level 5)     ED Triage Vitals [08/17/22 2126]   BP Temp Temp Source Heart Rate Resp SpO2 Height Weight   137/84 98.5 °F (36.9 °C) Oral 96 16 98 % 6' 1\" (1.854 m) 270 lb (122.5 kg)       Physical Exam  Vitals and nursing note reviewed. Constitutional:       General: He is not in acute distress. Appearance: Normal appearance. He is not ill-appearing or toxic-appearing. HENT:      Head: Normocephalic and atraumatic. Right Ear: External ear normal.      Left Ear: External ear normal.   Eyes:      Conjunctiva/sclera: Conjunctivae normal.   Cardiovascular:      Rate and Rhythm: Normal rate. Pulses: Normal pulses. Pulmonary:      Effort: Pulmonary effort is normal.   Musculoskeletal:      Cervical back: Normal range of motion. Left ankle: No swelling, deformity or lacerations. Tenderness (lateral ankle/tib fib) present.  Decreased range of neurovascular intact. Pain occurred tonight after trauma, no edema, doubt DVT. Patient instructed to follow-up with orthopedics, return for any new or worsening symptoms. FINAL IMPRESSION      1. Left leg pain          DISPOSITION/PLAN   DISPOSITION Decision To Discharge 08/17/2022 10:31:22 PM      PATIENT REFERRED TO:  Miki Castano MD  [address] [facility], [address] [place] [address],[address]  [address] [facility]  [phone]    In 2 days  Follow-up with your orthopedic doctor or one is listed here. Return for any new or worsening symptoms. DISCHARGE MEDICATIONS:  New Prescriptions    No medications on file       DISCONTINUED MEDICATIONS:  Discontinued Medications    No medications on file              (Please note that portions of this note were completed with a voice recognition program.  Efforts were made to edit the dictations but occasionally words are mis-transcribed. )    Jagruti Araujo PA-C (electronically signed)            Jagruti Araujo PA-C  08/17/22 [id number]

## 2022-10-31 NOTE — PROGRESS NOTES
[place]      Patient Name: Dee [address] Record Number:  [id number]  Primary Care Physician:  Jenifer Francisco  Date of Consultation: 11/1/2022    Chief Complaint:   Chief Complaint   Patient presents with    New Patient     States that he has feelings of his nose being clogged up all the time- Denies trouble with nose bleeds        HISTORY OF PRESENT ILLNESS  Celeste Valdovinos is a(n) 24 y.o. male who presents for evaluation of nasal congestion. He states that this has been going on for his entire life. He is tired of being clogged up. He also states he gets lots of sinus infections does not take any medications for the sinus section is a clear up. He has never tried any allergy medications. Is never had allergy testing. Is not any recent head neck imaging. He does have sleep apnea and requires a CPAP.       Patient Active Problem List   Diagnosis    Trimalleolar fracture of ankle, closed    Heartburn    Asthma    ADHD (attention deficit hyperactivity disorder)    Allergic rhinitis    Lumbosacral spondylosis without myelopathy    Myofascial muscle pain    PTSD (post-traumatic stress disorder)    ROXANNE (obstructive sleep apnea)     Past Surgical History:   Procedure Laterality Date    ANKLE FRACTURE SURGERY Left 07/13/2017    CYST REMOVAL      head  horn cysts     Family History   Problem Relation Age of Onset    Cancer Mother         breast    Depression Mother     Hypertension Maternal Grandfather     Arthritis Maternal Grandfather     Heart Attack Paternal Grandfather     No Known Problems Paternal Grandmother      Social History     Socioeconomic History    Marital status: Single     Spouse name: Not on file    Number of children: Not on file    Years of education: Not on file    Highest education level: Not on file   Occupational History    Occupation:      Employer: [employer]    Occupation: student   Tobacco Use    Smoking status: Some Days     Types: Cigarettes     Start date: 6/26/2014    Smokeless tobacco: Current    Tobacco comments:     just vapes   Vaping Use    Vaping Use: Every day    Start date: 6/26/2017    Substances: Nicotine, Flavoring   Substance and Sexual Activity    Alcohol use: Yes     Comment: 12 or more drinks per week worse with COVID    Drug use: No    Sexual activity: Yes     Partners: Female     Birth control/protection: Condom   Other Topics Concern    Not on file   Social History Narrative    Not on file     Social Determinants of Health     Financial Resource Strain: Not on file   Food Insecurity: Not on file   Transportation Needs: Not on file   Physical Activity: Not on file   Stress: Not on file   Social Connections: Not on file   Intimate Partner Violence: Not on file   Housing Stability: Not on file       DRUG/FOOD ALLERGIES: Iodine and Seroquel  [quetiapine]    CURRENT MEDICATIONS  Prior to Admission medications    Medication Sig Start Date End Date Taking? Authorizing Provider   fluticasone (FLONASE) 50 MCG/ACT nasal spray 1 spray by Each Nostril route 2 times daily 11/1/22  Yes Thuan De La Fuente DO   meloxicam (MOBIC) 15 MG tablet Take 1 tablet by mouth daily  Patient not taking: No sig reported 6/24/20   Piedad Burch MD   calcium carbonate (TUMS) 500 MG chewable tablet Take 1 tablet by mouth in the morning. Patient not taking: Reported on 11/1/2022    Historical Provider, MD       REVIEW OF SYSTEMS  The following systems were reviewed and revealed the following in addition to any already discussed in the HPI:    Review of Systems   Constitutional:  Negative for fatigue and fever. HENT:  Positive for congestion, sinus pressure and sinus pain. Negative for ear pain, postnasal drip, rhinorrhea and sneezing. Eyes:  Negative for pain and visual disturbance. Respiratory:  Negative for cough and shortness of breath. Cardiovascular:  Negative for chest pain. Gastrointestinal:  Negative for nausea and vomiting. Endocrine: Negative. Genitourinary: Negative. Musculoskeletal:  Negative for neck pain and neck stiffness. Skin:  Negative for rash. Neurological:  Negative for dizziness and headaches. PHYSICAL EXAM  Temp 97.5 °F (36.4 °C) (Infrared)   Resp 16   Ht 6' 1\" (1.854 m)   Wt 270 lb (122.5 kg)   BMI 35.62 kg/m²     GENERAL: No Acute Distress, Alert and Oriented, no hoarseness  EYES: EOMI, Anti-icteric  NOSE: No epistaxis, nasal mucosa within normal limits, no purulent drainage  EARS: Normal external canal appearance, EAC patent bilaterally, TMs intact bilaterally with no evidence of effusion  FACE: 1/6 House-Brackmann Scale, symmetric, sensation equal bilaterally  ORAL CAVITY: No masses or lesions palpated, uvula is midline, moist mucous membranes,   NECK: Normal range of motion, no thyromegaly, trachea is midline, no lymphadenopathy, no neck masses, no crepitus  CHEST: Normal respiratory effort, no retractions, breathing comfortably  SKIN: No rashes, normal appearing skin, no evidence of skin lesions/tumors    RADIOLOGY  Summary of findings:    PROCEDURE  Nasal Endoscopy    Was performed due to chronic rhinosinusitis    Verbal consent was received. After topical anesthesia and decongestion had been obtained using aerosolized 1% lidocaine and oxymetazoline, a 45 degree rigid endoscope was placed into both nares with the patient in a sitting position.  The following was observed:    Right Nasal Cavity and Paranasal Sinuses:  Polyp score = 0 (0 = no polyps, 1 = small polyps in middle meatus not reaching below the inferior border of the middle russell, 2 = polyps reaching below the middle border of the middle turbinate, 3= large polyps reaching the lower border of the inferior turbinate or polyps medial to the middle russell, 4= large polyps causing almost complete congestion/obstruction of the interior meatus)  Edema score = 1 (0 = absent, 1 = mild, 2 = severe)  Discharge score = 0 (0 = no discharge, 1 = clear thin discharge, 2 = thick purulent discharge)    Left Nasal Cavity and Paranasal Sinuses:    Polyp score = 0 (0 = no polyps, 1 = small polyps in middle meatus not reaching below the inferior border of the middle russell, 2 = polyps reaching below the middle border of the middle turbinate, 3= large polyps reaching the lower border of the inferior turbinate or polyps medial to the middle russell, 4= large polyps causing almost complete congestion/obstruction of the interior meatus)  Edema score = 1 (0 = absent, 1 = mild, 2 = severe)  Discharge score = 0 (0 = no discharge, 1 = clear thin discharge, 2 = thick purulent discharge)    Septum: intact and deviated to the right  Other: The inferior and middle turbinates were examined. There were no complications. ASSESSMENT/PLAN  Stanley Barlow is a very pleasant 24 y.o. male with     1. Seasonal allergies  We will try treating his allergies to see if this will help with his nasal breathing.  - DE NASAL ENDOSCOPY,DX  - fluticasone (FLONASE) 50 MCG/ACT nasal spray; 1 spray by Each Nostril route 2 times daily  Dispense: 16 g; Refill: 2    2. Deviated nasal septum  3. Hypertrophy of inferior nasal turbinate  He does have a deviated septum with inferior turban hypertrophy which would be amenable to surgery however we will try treating with allergy medications prior to moving forward with surgery. Follow up in 1 month. Medical Decision Making:   The following items were considered in medical decision making:  Independent review of images  Review / order clinical lab tests  Review / order radiology tests  Decision to obtain old records

## 2022-11-01 ENCOUNTER — OFFICE VISIT (OUTPATIENT)
Dept: ENT CLINIC | Age: 27
End: 2022-11-01
Payer: COMMERCIAL

## 2022-11-01 VITALS — HEIGHT: 73 IN | TEMPERATURE: 97.5 F | WEIGHT: 270 LBS | BODY MASS INDEX: 35.78 KG/M2 | RESPIRATION RATE: 16 BRPM

## 2022-11-01 DIAGNOSIS — J34.2 DEVIATED NASAL SEPTUM: ICD-10-CM

## 2022-11-01 DIAGNOSIS — J34.3 HYPERTROPHY OF INFERIOR NASAL TURBINATE: ICD-10-CM

## 2022-11-01 DIAGNOSIS — J30.2 SEASONAL ALLERGIES: Primary | ICD-10-CM

## 2022-11-01 PROCEDURE — 31231 NASAL ENDOSCOPY DX: CPT | Performed by: STUDENT IN AN ORGANIZED HEALTH CARE EDUCATION/TRAINING PROGRAM

## 2022-11-01 PROCEDURE — G8417 CALC BMI ABV UP PARAM F/U: HCPCS | Performed by: STUDENT IN AN ORGANIZED HEALTH CARE EDUCATION/TRAINING PROGRAM

## 2022-11-01 PROCEDURE — 99203 OFFICE O/P NEW LOW 30 MIN: CPT | Performed by: STUDENT IN AN ORGANIZED HEALTH CARE EDUCATION/TRAINING PROGRAM

## 2022-11-01 PROCEDURE — G8484 FLU IMMUNIZE NO ADMIN: HCPCS | Performed by: STUDENT IN AN ORGANIZED HEALTH CARE EDUCATION/TRAINING PROGRAM

## 2022-11-01 PROCEDURE — 4004F PT TOBACCO SCREEN RCVD TLK: CPT | Performed by: STUDENT IN AN ORGANIZED HEALTH CARE EDUCATION/TRAINING PROGRAM

## 2022-11-01 PROCEDURE — G8427 DOCREV CUR MEDS BY ELIG CLIN: HCPCS | Performed by: STUDENT IN AN ORGANIZED HEALTH CARE EDUCATION/TRAINING PROGRAM

## 2022-11-01 RX ORDER — FLUTICASONE PROPIONATE 50 MCG
1 SPRAY, SUSPENSION (ML) NASAL 2 TIMES DAILY
Qty: 16 G | Refills: 2 | Status: SHIPPED | OUTPATIENT
Start: 2022-11-01

## 2022-11-01 ASSESSMENT — ENCOUNTER SYMPTOMS
EYE PAIN: 0
RHINORRHEA: 0
SHORTNESS OF BREATH: 0
SINUS PAIN: 1
SINUS PRESSURE: 1
VOMITING: 0
COUGH: 0
NAUSEA: 0

## 2024-05-01 ENCOUNTER — HOSPITAL ENCOUNTER (EMERGENCY)
Facility: HOSPITAL | Age: 29
Discharge: HOME | End: 2024-05-01
Attending: EMERGENCY MEDICINE
Payer: COMMERCIAL

## 2024-05-01 VITALS
WEIGHT: 285.5 LBS | RESPIRATION RATE: 16 BRPM | HEIGHT: 73 IN | OXYGEN SATURATION: 100 % | SYSTOLIC BLOOD PRESSURE: 127 MMHG | HEART RATE: 75 BPM | TEMPERATURE: 98.3 F | BODY MASS INDEX: 37.84 KG/M2 | DIASTOLIC BLOOD PRESSURE: 88 MMHG

## 2024-05-01 DIAGNOSIS — R10.13 EPIGASTRIC PAIN: Primary | ICD-10-CM

## 2024-05-01 DIAGNOSIS — M54.50 LUMBAR BACK PAIN: ICD-10-CM

## 2024-05-01 LAB
APPEARANCE UR: ABNORMAL
BILIRUB UR STRIP.AUTO-MCNC: NEGATIVE MG/DL
CAOX CRY #/AREA UR COMP ASSIST: ABNORMAL /HPF
COLOR UR: YELLOW
GLUCOSE UR STRIP.AUTO-MCNC: NEGATIVE MG/DL
KETONES UR STRIP.AUTO-MCNC: NEGATIVE MG/DL
LEUKOCYTE ESTERASE UR QL STRIP.AUTO: ABNORMAL
NITRITE UR QL STRIP.AUTO: NEGATIVE
PH UR STRIP.AUTO: 6 [PH]
PROT UR STRIP.AUTO-MCNC: NEGATIVE MG/DL
RBC # UR STRIP.AUTO: NEGATIVE /UL
RBC #/AREA URNS AUTO: ABNORMAL /HPF
SP GR UR STRIP.AUTO: 1.01
UROBILINOGEN UR STRIP.AUTO-MCNC: <2 MG/DL
WBC #/AREA URNS AUTO: ABNORMAL /HPF

## 2024-05-01 PROCEDURE — 99283 EMERGENCY DEPT VISIT LOW MDM: CPT

## 2024-05-01 PROCEDURE — 2500000001 HC RX 250 WO HCPCS SELF ADMINISTERED DRUGS (ALT 637 FOR MEDICARE OP): Performed by: EMERGENCY MEDICINE

## 2024-05-01 PROCEDURE — 81001 URINALYSIS AUTO W/SCOPE: CPT | Performed by: EMERGENCY MEDICINE

## 2024-05-01 PROCEDURE — 87086 URINE CULTURE/COLONY COUNT: CPT | Mod: PORLAB | Performed by: EMERGENCY MEDICINE

## 2024-05-01 RX ADMIN — ALUMINUM HYDROXIDE, MAGNESIUM HYDROXIDE, AND DIMETHICONE 30 ML: 200; 20; 200 SUSPENSION ORAL at 21:18

## 2024-05-01 ASSESSMENT — PAIN - FUNCTIONAL ASSESSMENT: PAIN_FUNCTIONAL_ASSESSMENT: 0-10

## 2024-05-01 ASSESSMENT — PAIN DESCRIPTION - LOCATION: LOCATION: ABDOMEN

## 2024-05-01 ASSESSMENT — PAIN DESCRIPTION - FREQUENCY: FREQUENCY: CONSTANT/CONTINUOUS

## 2024-05-01 ASSESSMENT — LIFESTYLE VARIABLES
TOTAL SCORE: 0
EVER FELT BAD OR GUILTY ABOUT YOUR DRINKING: NO
EVER HAD A DRINK FIRST THING IN THE MORNING TO STEADY YOUR NERVES TO GET RID OF A HANGOVER: NO
HAVE YOU EVER FELT YOU SHOULD CUT DOWN ON YOUR DRINKING: NO
HAVE PEOPLE ANNOYED YOU BY CRITICIZING YOUR DRINKING: NO

## 2024-05-01 ASSESSMENT — COLUMBIA-SUICIDE SEVERITY RATING SCALE - C-SSRS
6. HAVE YOU EVER DONE ANYTHING, STARTED TO DO ANYTHING, OR PREPARED TO DO ANYTHING TO END YOUR LIFE?: NO
2. HAVE YOU ACTUALLY HAD ANY THOUGHTS OF KILLING YOURSELF?: NO
1. IN THE PAST MONTH, HAVE YOU WISHED YOU WERE DEAD OR WISHED YOU COULD GO TO SLEEP AND NOT WAKE UP?: NO

## 2024-05-01 ASSESSMENT — PAIN DESCRIPTION - PAIN TYPE: TYPE: ACUTE PAIN

## 2024-05-01 ASSESSMENT — PAIN SCALES - GENERAL: PAINLEVEL_OUTOF10: 5 - MODERATE PAIN

## 2024-05-01 ASSESSMENT — PAIN DESCRIPTION - DESCRIPTORS: DESCRIPTORS: ACHING;DULL

## 2024-05-02 LAB — HOLD SPECIMEN: NORMAL

## 2024-05-02 NOTE — ED PROVIDER NOTES
"HPI   Chief Complaint   Patient presents with    Abdominal Pain     Pt c/o burning and sharp pain epigastric since last night ABD pain and pain LLQ  x 2 days went to  and sent here for further evaluation c/o burning and pain up into chest        Patient presents to the emergency department secondary to epigastric abdominal pain since last night.  Patient also has some mild left lower quadrant pain for the past 2 days.  He has chronic back pain which is slightly worse than usual.  It seems to localize more to the left but is bilateral.  He has had nausea but no vomiting.  He has had no burning with urination urinary frequency or hematuria.  No diarrhea or constipation.  Patient states that he takes an acids a lot because of being \"overweight and drinking alcohol\".  The patient tried antacids at home without significant relief.  He went to urgent care and urgent care recommended he come to the emergency department.                          [place] Coma Scale Score: 15                  Patient History   History reviewed. No pertinent past medical history.  History reviewed. No pertinent surgical history.  No family history on file.  Social History     Tobacco Use    Smoking status: Never    Smokeless tobacco: Current   Vaping Use    Vaping status: Every Day   Substance Use Topics    Alcohol use: Yes    Drug use: Never       Physical Exam   ED Triage Vitals [05/01/24 2014]   Temperature Heart Rate Respirations BP   36.8 °C (98.3 °F) 80 20 133/77      Pulse Ox Temp Source Heart Rate Source Patient Position   97 % Tympanic Monitor Sitting      BP Location FiO2 (%)     Left arm --       Physical Exam  Vitals and nursing note reviewed.   Constitutional:       Appearance: Normal appearance.   HENT:      Head: Normocephalic.      Right Ear: Tympanic membrane normal.      Left Ear: Tympanic membrane normal.      Nose: Nose normal.      Mouth/Throat:      Mouth: Mucous membranes are moist.   Eyes:      Extraocular Movements: " Extraocular movements intact.      Pupils: Pupils are equal, round, and reactive to light.   Cardiovascular:      Rate and Rhythm: Normal rate and regular rhythm.      Pulses: Normal pulses.      Heart sounds: Normal heart sounds.   Pulmonary:      Effort: Pulmonary effort is normal.      Breath sounds: Normal breath sounds.   Abdominal:      General: Abdomen is flat. Bowel sounds are normal.      Palpations: Abdomen is soft.      Tenderness: There is abdominal tenderness in the epigastric area. There is no guarding or rebound.      Hernia: No hernia is present.   Musculoskeletal:         General: Normal range of motion.   Skin:     General: Skin is warm and dry.      Capillary Refill: Capillary refill takes less than 2 seconds.   Neurological:      General: No focal deficit present.      Mental Status: He is alert and oriented to person, place, and time.   Psychiatric:         Mood and Affect: Mood normal.         Behavior: Behavior normal.       Labs Reviewed   URINALYSIS WITH REFLEX CULTURE AND MICROSCOPIC - Abnormal       Result Value    Color, Urine Yellow      Appearance, Urine Hazy (*)     Specific Gravity, Urine 1.013      pH, Urine 6.0      Protein, Urine NEGATIVE      Glucose, Urine NEGATIVE      Blood, Urine NEGATIVE      Ketones, Urine NEGATIVE      Bilirubin, Urine NEGATIVE      Urobilinogen, Urine <2.0      Nitrite, Urine NEGATIVE      Leukocyte Esterase, Urine TRACE (*)    MICROSCOPIC ONLY, URINE - Abnormal    WBC, Urine 1-5      RBC, Urine NONE      Calcium Oxalate Crystals, Urine 2+ (*)    URINE CULTURE   URINALYSIS WITH REFLEX CULTURE AND MICROSCOPIC    Narrative:     The following orders were created for panel order Urinalysis with Reflex Culture and Microscopic.  Procedure                               Abnormality         Status                     ---------                               -----------         ------                     Urinalysis with Reflex C...[[id number]]  Abnormal             Final result               Extra Urine Gray Tube[[id number]]                            In process                   Please view results for these tests on the individual orders.   EXTRA URINE GRAY TUBE     Pain Management Panel           No data to display              No orders to display     ED Course & MDM   Diagnoses as of 05/01/24 2208   Epigastric pain   Lumbar back pain       Medical Decision Making  Patient presents with epigastric abdominal pain.  Patient is given a Méndez cocktail and urinalysis is obtained.  Differential diagnosis is gastritis, cholecystitis, ureterolithiasis, urinary tract infection, musculoskeletal pain or other acute cause.  Patient was given a Méndez cocktail and after this he feels significantly improved.  Urinalysis shows no evidence of infection.  No hematuria.  At this time patient is hemodynamically stable and feels improved.  He was offered prescriptions for Protonix or lidocaine patches.  At this time he declines.  He is planning on using over-the-counter medication.  He has a primary care doctor in [place].  He is currently only here to work and will return home on Saturday.  He understands return precautions and is stable for discharge at this time.        Procedure  Procedures     Mignon Maya MD  05/01/24 2208

## 2024-05-03 LAB — BACTERIA UR CULT: NO GROWTH
